# Patient Record
Sex: FEMALE | Race: WHITE | NOT HISPANIC OR LATINO | Employment: UNEMPLOYED | URBAN - METROPOLITAN AREA
[De-identification: names, ages, dates, MRNs, and addresses within clinical notes are randomized per-mention and may not be internally consistent; named-entity substitution may affect disease eponyms.]

---

## 2017-01-11 ENCOUNTER — HOSPITAL ENCOUNTER (OUTPATIENT)
Facility: HOSPITAL | Age: 22
Discharge: HOME | End: 2017-01-11
Attending: OBSTETRICS & GYNECOLOGY | Admitting: OBSTETRICS & GYNECOLOGY

## 2017-01-11 VITALS
TEMPERATURE: 98.9 F | DIASTOLIC BLOOD PRESSURE: 61 MMHG | HEIGHT: 65 IN | BODY MASS INDEX: 41.65 KG/M2 | WEIGHT: 250 LBS | SYSTOLIC BLOOD PRESSURE: 119 MMHG | HEART RATE: 127 BPM | RESPIRATION RATE: 18 BRPM

## 2017-01-11 DIAGNOSIS — R10.9 ABDOMINAL PAIN AFFECTING PREGNANCY: ICD-10-CM

## 2017-01-11 DIAGNOSIS — O26.899 ABDOMINAL PAIN AFFECTING PREGNANCY: ICD-10-CM

## 2017-01-11 PROCEDURE — 99204 OFFICE O/P NEW MOD 45 MIN: CPT

## 2017-01-11 PROCEDURE — 76817 TRANSVAGINAL US OBSTETRIC: CPT | Performed by: OBSTETRICS & GYNECOLOGY

## 2017-01-11 NOTE — IP AVS SNAPSHOT
[address] [place]  [phone]               AFTER VISIT SUMMARY             Martina 5454   21 yrs Female (1995)    MRN:  [id number]            Medications    It is important that you maintain an up-to-date list of medications (prescribed and over-the-counter, as well as vitamins and mineral supplements) that you are taking  Bring your list of current medications whenever you seek treatment at a hospital or other healthcare setting  If you have any questions or concerns, contact your primary care physician's office  Your Medications      Notice     You have not been prescribed any medications  Your Medications      Notice     You have not been prescribed any medications  Summary of Your Hospitalization        About your hospitalization     You were discharged on:  January 11, 2017 You last received care in the:  [address] [facility]     Unit phone number:  [phone]      Reason for Hospitalization     Your primary diagnosis was:  Not on File      Chief Complaint     Abdominal Pain      Treatment Team     Provider Service Role Specialty Primary office phone    Alan Crocker MD OB/GYN Attending Provider Obstetrics and Gynecology [phone]    Jolly Garcia RN -- Registered Nurse  Obstetrics Number not on file         Imaging Results     No results found  Follow-ups for After Discharge        Your Allergies  Date Reviewed: 1/11/2017    No active allergies      Traka     To access this document and other health information online, please visit www  [employer] [facility] to login or create a patient portal account  For questions about any pending test results, you may contact the ordering physician or your primary care provider             Instructions for After Discharge          Educational Information     Notify Physician for any of the following:    · Regular contractions - More than 6 per hour every 5 minutes    · Leakage of fluid (water breaks)  Observe time and color of fluid  · Vaginal bleeding  · Elevated temperature greater than 100 4°F     · Symptoms of frequent urination, blood in the urine and/or burning upon urination  · Decrease in fetal movement in last 8 hours, Fetal kick count less than 10 movements in 2 hours    · Any other questions and/or problems

## 2017-01-12 NOTE — OB NST/BPP/US
Jazz Oro, a  at 24w1d with an KOMAL of 2017, by Patient Reported, was seen at [facility] for the following procedure(s): $Procedure Type: US - Transvaginal]                   Ultrasound Other  Cervical Length: 3 5

## 2017-01-12 NOTE — PROGRESS NOTES
OB Triage Note  Sussy Manriquez 19 y o  female MRN: [id number]  Unit/Bed#: L&D 329-02     KOMAL: Estimated Date of Delivery: 17    HPI: 19 y o   at 25w3d with c/o cramping  She  denies having uterine contractions, no LOF, and no VB  She states she has felt good FM  Pt states she started feeling cramping today, she says it feels like menstrual cramping  FHT: 150 appropriate for gestational age, no deccles     TOCO:  none       Vitals:   HR:  [127] 127  Resp:  [18] 18  BP: (119)/(61) 119/61    Physical Exam:  General: AAOX3  No acute distress   Abdominal: Soft  NT/ND  Gravid  : no pooling, no VB, os closed, no discharge  Wet: neg KOH: neg    SVE:deferred      TVUS: CL 3 5 cm     Ultrasound: see fetal testing note  ~_~_~_~_~_~_~_~_~_~_~_~_~_~_~_~_~_~_~_~_~_~_~_~_~_~_~_~_~_~_~_~_~_~_~    A/P:  at 24w1d here for rule out  labor  After evaluation, no signs of  labor  Fetal testing reassuring  · Discharge home with precautions  Instructions reviewed with patient  Dr Von Garay aware  Signature/Title:  Tali Hogan  Date: 2017  Time: 10:30 PM

## 2017-01-13 PROBLEM — Z3A.24 24 WEEKS GESTATION OF PREGNANCY: Status: ACTIVE | Noted: 2017-01-13

## 2017-02-13 ENCOUNTER — GENERIC CONVERSION - ENCOUNTER (OUTPATIENT)
Dept: OTHER | Facility: OTHER | Age: 22
End: 2017-02-13

## 2017-02-13 ENCOUNTER — ALLSCRIPTS OFFICE VISIT (OUTPATIENT)
Dept: PERINATAL CARE | Facility: CLINIC | Age: 22
End: 2017-02-13

## 2017-02-13 PROCEDURE — 76816 OB US FOLLOW-UP PER FETUS: CPT | Performed by: OBSTETRICS & GYNECOLOGY

## 2017-03-28 ENCOUNTER — ALLSCRIPTS OFFICE VISIT (OUTPATIENT)
Dept: PERINATAL CARE | Facility: CLINIC | Age: 22
End: 2017-03-28

## 2017-03-28 ENCOUNTER — GENERIC CONVERSION - ENCOUNTER (OUTPATIENT)
Dept: OTHER | Facility: OTHER | Age: 22
End: 2017-03-28

## 2017-03-28 PROCEDURE — 76816 OB US FOLLOW-UP PER FETUS: CPT | Performed by: OBSTETRICS & GYNECOLOGY

## 2017-04-04 ENCOUNTER — GENERIC CONVERSION - ENCOUNTER (OUTPATIENT)
Dept: OTHER | Facility: OTHER | Age: 22
End: 2017-04-04

## 2017-04-04 ENCOUNTER — APPOINTMENT (OUTPATIENT)
Dept: LAB | Facility: HOSPITAL | Age: 22
End: 2017-04-04

## 2017-04-04 ENCOUNTER — LAB REQUISITION (OUTPATIENT)
Dept: LAB | Facility: HOSPITAL | Age: 22
End: 2017-04-04

## 2017-04-04 DIAGNOSIS — Z34.90 ENCOUNTER FOR SUPERVISION OF NORMAL PREGNANCY: ICD-10-CM

## 2017-04-04 DIAGNOSIS — Z33.1 PREGNANT STATE, INCIDENTAL: ICD-10-CM

## 2017-04-04 DIAGNOSIS — Z34.03 ENCOUNTER FOR SUPERVISION OF NORMAL FIRST PREGNANCY IN THIRD TRIMESTER: ICD-10-CM

## 2017-04-04 LAB
ABO GROUP BLD: NORMAL
BACTERIA UR QL AUTO: ABNORMAL /HPF
BASOPHILS # BLD AUTO: 0.01 THOUSANDS/ΜL (ref 0–0.1)
BASOPHILS NFR BLD AUTO: 0 % (ref 0–1)
BILIRUB UR QL STRIP: NEGATIVE
BLD GP AB SCN SERPL QL: NEGATIVE
CLARITY UR: ABNORMAL
COLOR UR: ABNORMAL
EOSINOPHIL # BLD AUTO: 0.13 THOUSAND/ΜL (ref 0–0.61)
EOSINOPHIL NFR BLD AUTO: 2 % (ref 0–6)
ERYTHROCYTE [DISTWIDTH] IN BLOOD BY AUTOMATED COUNT: 15.4 % (ref 11.6–15.1)
GLUCOSE 1H P 50 G GLC PO SERPL-MCNC: 187 MG/DL
GLUCOSE UR STRIP-MCNC: NEGATIVE MG/DL
HCT VFR BLD AUTO: 29.6 % (ref 34.8–46.1)
HGB BLD-MCNC: 9.4 G/DL (ref 11.5–15.4)
HGB UR QL STRIP.AUTO: ABNORMAL
KETONES UR STRIP-MCNC: NEGATIVE MG/DL
LEUKOCYTE ESTERASE UR QL STRIP: ABNORMAL
LYMPHOCYTES # BLD AUTO: 1.65 THOUSANDS/ΜL (ref 0.6–4.47)
LYMPHOCYTES NFR BLD AUTO: 22 % (ref 14–44)
MCH RBC QN AUTO: 24.3 PG (ref 26.8–34.3)
MCHC RBC AUTO-ENTMCNC: 31.8 G/DL (ref 31.4–37.4)
MCV RBC AUTO: 77 FL (ref 82–98)
MONOCYTES # BLD AUTO: 0.33 THOUSAND/ΜL (ref 0.17–1.22)
MONOCYTES NFR BLD AUTO: 4 % (ref 4–12)
NEUTROPHILS # BLD AUTO: 5.34 THOUSANDS/ΜL (ref 1.85–7.62)
NEUTS SEG NFR BLD AUTO: 72 % (ref 43–75)
NITRITE UR QL STRIP: NEGATIVE
NON-SQ EPI CELLS URNS QL MICRO: ABNORMAL /HPF
PH UR STRIP.AUTO: 6 [PH] (ref 4.5–8)
PLATELET # BLD AUTO: 259 THOUSANDS/UL (ref 149–390)
PMV BLD AUTO: 10.1 FL (ref 8.9–12.7)
PROT UR STRIP-MCNC: ABNORMAL MG/DL
RBC # BLD AUTO: 3.87 MILLION/UL (ref 3.81–5.12)
RBC #/AREA URNS AUTO: ABNORMAL /HPF
RH BLD: POSITIVE
RUBV IGG SERPL IA-ACNC: >175 IU/ML
SP GR UR STRIP.AUTO: >=1.03 (ref 1–1.03)
UROBILINOGEN UR QL STRIP.AUTO: 0.2 E.U./DL
WBC # BLD AUTO: 7.46 THOUSAND/UL (ref 4.31–10.16)
WBC #/AREA URNS AUTO: ABNORMAL /HPF

## 2017-04-04 PROCEDURE — 80081 OBSTETRIC PANEL INC HIV TSTG: CPT

## 2017-04-04 PROCEDURE — 87591 N.GONORRHOEAE DNA AMP PROB: CPT | Performed by: OBSTETRICS & GYNECOLOGY

## 2017-04-04 PROCEDURE — 82950 GLUCOSE TEST: CPT

## 2017-04-04 PROCEDURE — 87491 CHLMYD TRACH DNA AMP PROBE: CPT | Performed by: OBSTETRICS & GYNECOLOGY

## 2017-04-04 PROCEDURE — 36415 COLL VENOUS BLD VENIPUNCTURE: CPT

## 2017-04-04 PROCEDURE — 81001 URINALYSIS AUTO W/SCOPE: CPT

## 2017-04-04 PROCEDURE — 87653 STREP B DNA AMP PROBE: CPT

## 2017-04-04 PROCEDURE — 87086 URINE CULTURE/COLONY COUNT: CPT

## 2017-04-05 LAB
BACTERIA UR CULT: NORMAL
CHLAMYDIA DNA CVX QL NAA+PROBE: NORMAL
HBV SURFACE AG SER QL: NORMAL
HIV 1+2 AB+HIV1 P24 AG SERPL QL IA: NORMAL
N GONORRHOEA DNA GENITAL QL NAA+PROBE: NORMAL
RPR SER QL: NORMAL

## 2017-04-06 LAB — GP B STREP DNA SPEC QL NAA+PROBE: NORMAL

## 2017-04-11 ENCOUNTER — ALLSCRIPTS OFFICE VISIT (OUTPATIENT)
Dept: OTHER | Facility: OTHER | Age: 22
End: 2017-04-11

## 2017-04-11 ENCOUNTER — ALLSCRIPTS OFFICE VISIT (OUTPATIENT)
Dept: PERINATAL CARE | Facility: CLINIC | Age: 22
End: 2017-04-11

## 2017-04-11 ENCOUNTER — GENERIC CONVERSION - ENCOUNTER (OUTPATIENT)
Dept: OTHER | Facility: OTHER | Age: 22
End: 2017-04-11

## 2017-04-11 LAB
CLARITY UR: NORMAL
COLOR UR: YELLOW
GLUCOSE (HISTORICAL): NORMAL
HGB UR QL STRIP.AUTO: NORMAL
LEUKOCYTE ESTERASE UR QL STRIP: NORMAL
PROT UR STRIP-MCNC: NORMAL MG/DL

## 2017-04-11 PROCEDURE — 76815 OB US LIMITED FETUS(S): CPT | Performed by: OBSTETRICS & GYNECOLOGY

## 2017-04-11 PROCEDURE — 59025 FETAL NON-STRESS TEST: CPT | Performed by: OBSTETRICS & GYNECOLOGY

## 2017-04-18 ENCOUNTER — APPOINTMENT (OUTPATIENT)
Dept: PERINATAL CARE | Facility: CLINIC | Age: 22
End: 2017-04-18

## 2017-04-18 ENCOUNTER — ALLSCRIPTS OFFICE VISIT (OUTPATIENT)
Dept: PERINATAL CARE | Facility: CLINIC | Age: 22
End: 2017-04-18

## 2017-04-18 ENCOUNTER — ALLSCRIPTS OFFICE VISIT (OUTPATIENT)
Dept: OTHER | Facility: OTHER | Age: 22
End: 2017-04-18

## 2017-04-18 ENCOUNTER — GENERIC CONVERSION - ENCOUNTER (OUTPATIENT)
Dept: OTHER | Facility: OTHER | Age: 22
End: 2017-04-18

## 2017-04-18 LAB
GLUCOSE (HISTORICAL): NORMAL
PROT UR STRIP-MCNC: NORMAL MG/DL

## 2017-04-18 PROCEDURE — 76818 FETAL BIOPHYS PROFILE W/NST: CPT | Performed by: OBSTETRICS & GYNECOLOGY

## 2017-04-25 ENCOUNTER — GENERIC CONVERSION - ENCOUNTER (OUTPATIENT)
Dept: OTHER | Facility: OTHER | Age: 22
End: 2017-04-25

## 2017-04-25 ENCOUNTER — ALLSCRIPTS OFFICE VISIT (OUTPATIENT)
Dept: PERINATAL CARE | Facility: CLINIC | Age: 22
End: 2017-04-25

## 2017-04-25 PROCEDURE — 76818 FETAL BIOPHYS PROFILE W/NST: CPT | Performed by: OBSTETRICS & GYNECOLOGY

## 2017-04-25 PROCEDURE — 76816 OB US FOLLOW-UP PER FETUS: CPT | Performed by: OBSTETRICS & GYNECOLOGY

## 2017-04-26 ENCOUNTER — HOSPITAL ENCOUNTER (OUTPATIENT)
Dept: LABOR AND DELIVERY | Facility: HOSPITAL | Age: 22
Discharge: HOME | DRG: 560 | End: 2017-04-26
Payer: COMMERCIAL

## 2017-04-26 ENCOUNTER — HOSPITAL ENCOUNTER (INPATIENT)
Facility: HOSPITAL | Age: 22
LOS: 4 days | Discharge: HOME | DRG: 560 | End: 2017-04-30
Attending: OBSTETRICS & GYNECOLOGY | Admitting: OBSTETRICS & GYNECOLOGY
Payer: COMMERCIAL

## 2017-04-26 DIAGNOSIS — E66.01 MORBID OBESITY DUE TO EXCESS CALORIES (HCC): ICD-10-CM

## 2017-04-26 DIAGNOSIS — O09.299 HX OF PREECLAMPSIA, PRIOR PREGNANCY, CURRENTLY PREGNANT: ICD-10-CM

## 2017-04-26 DIAGNOSIS — Z3A.39 39 WEEKS GESTATION OF PREGNANCY: Primary | ICD-10-CM

## 2017-04-26 DIAGNOSIS — D50.8 IRON DEFICIENCY ANEMIA SECONDARY TO INADEQUATE DIETARY IRON INTAKE: ICD-10-CM

## 2017-04-26 DIAGNOSIS — O24.419 GESTATIONAL DIABETES MELLITUS: ICD-10-CM

## 2017-04-26 PROBLEM — D64.9 ANEMIA: Status: ACTIVE | Noted: 2017-04-26

## 2017-04-26 PROBLEM — Z3A.24 24 WEEKS GESTATION OF PREGNANCY: Status: RESOLVED | Noted: 2017-01-13 | Resolved: 2017-04-26

## 2017-04-26 LAB
ABO GROUP BLD: NORMAL
BLD GP AB SCN SERPL QL: NEGATIVE
ERYTHROCYTE [DISTWIDTH] IN BLOOD BY AUTOMATED COUNT: 15.8 % (ref 11.6–15.1)
HCT VFR BLD AUTO: 29.9 % (ref 34.8–46.1)
HGB BLD-MCNC: 9.6 G/DL (ref 11.5–15.4)
MCH RBC QN AUTO: 24.4 PG (ref 26.8–34.3)
MCHC RBC AUTO-ENTMCNC: 32.1 G/DL (ref 31.4–37.4)
MCV RBC AUTO: 76 FL (ref 82–98)
PLATELET # BLD AUTO: 253 THOUSANDS/UL (ref 149–390)
PMV BLD AUTO: 10.2 FL (ref 8.9–12.7)
RBC # BLD AUTO: 3.94 MILLION/UL (ref 3.81–5.12)
RH BLD: POSITIVE
SPECIMEN EXPIRATION DATE: NORMAL
WBC # BLD AUTO: 8.87 THOUSAND/UL (ref 4.31–10.16)

## 2017-04-26 PROCEDURE — 4A1HXCZ MONITORING OF PRODUCTS OF CONCEPTION, CARDIAC RATE, EXTERNAL APPROACH: ICD-10-PCS | Performed by: OBSTETRICS & GYNECOLOGY

## 2017-04-26 PROCEDURE — 86592 SYPHILIS TEST NON-TREP QUAL: CPT | Performed by: OBSTETRICS & GYNECOLOGY

## 2017-04-26 PROCEDURE — 3E0P7GC INTRODUCTION OF OTHER THERAPEUTIC SUBSTANCE INTO FEMALE REPRODUCTIVE, VIA NATURAL OR ARTIFICIAL OPENING: ICD-10-PCS | Performed by: OBSTETRICS & GYNECOLOGY

## 2017-04-26 PROCEDURE — 86901 BLOOD TYPING SEROLOGIC RH(D): CPT | Performed by: OBSTETRICS & GYNECOLOGY

## 2017-04-26 PROCEDURE — 82948 REAGENT STRIP/BLOOD GLUCOSE: CPT

## 2017-04-26 PROCEDURE — 86850 RBC ANTIBODY SCREEN: CPT | Performed by: OBSTETRICS & GYNECOLOGY

## 2017-04-26 PROCEDURE — 86900 BLOOD TYPING SEROLOGIC ABO: CPT | Performed by: OBSTETRICS & GYNECOLOGY

## 2017-04-26 PROCEDURE — 85027 COMPLETE CBC AUTOMATED: CPT | Performed by: OBSTETRICS & GYNECOLOGY

## 2017-04-26 RX ORDER — LANOLIN ALCOHOL/MO/W.PET/CERES
CREAM (GRAM) TOPICAL
COMMUNITY

## 2017-04-26 RX ORDER — ONDANSETRON 2 MG/ML
4 INJECTION INTRAMUSCULAR; INTRAVENOUS EVERY 6 HOURS PRN
Status: DISCONTINUED | OUTPATIENT
Start: 2017-04-26 | End: 2017-04-28

## 2017-04-26 RX ORDER — SODIUM CHLORIDE, SODIUM LACTATE, POTASSIUM CHLORIDE, CALCIUM CHLORIDE 600; 310; 30; 20 MG/100ML; MG/100ML; MG/100ML; MG/100ML
125 INJECTION, SOLUTION INTRAVENOUS CONTINUOUS
Status: DISCONTINUED | OUTPATIENT
Start: 2017-04-26 | End: 2017-04-28

## 2017-04-26 RX ADMIN — MISOPROSTOL 25 MCG: 100 TABLET ORAL at 22:31

## 2017-04-27 ENCOUNTER — ANESTHESIA EVENT (INPATIENT)
Dept: LABOR AND DELIVERY | Facility: HOSPITAL | Age: 22
DRG: 560 | End: 2017-04-27
Payer: COMMERCIAL

## 2017-04-27 ENCOUNTER — GENERIC CONVERSION - ENCOUNTER (OUTPATIENT)
Dept: OTHER | Facility: OTHER | Age: 22
End: 2017-04-27

## 2017-04-27 LAB
AMPHETAMINES SERPL QL SCN: NEGATIVE
BARBITURATES UR QL: NEGATIVE
BENZODIAZ UR QL: NEGATIVE
COCAINE UR QL: NEGATIVE
GLUCOSE SERPL-MCNC: 105 MG/DL (ref 65–140)
GLUCOSE SERPL-MCNC: 70 MG/DL (ref 65–140)
GLUCOSE SERPL-MCNC: 77 MG/DL (ref 65–140)
GLUCOSE SERPL-MCNC: 80 MG/DL (ref 65–140)
GLUCOSE SERPL-MCNC: 81 MG/DL (ref 65–140)
GLUCOSE SERPL-MCNC: 88 MG/DL (ref 65–140)
METHADONE UR QL: NEGATIVE
OPIATES UR QL SCN: NEGATIVE
PCP UR QL: NEGATIVE
RPR SER QL: NORMAL
THC UR QL: NEGATIVE

## 2017-04-27 PROCEDURE — 4A1H7CZ MONITORING OF PRODUCTS OF CONCEPTION, CARDIAC RATE, VIA NATURAL OR ARTIFICIAL OPENING: ICD-10-PCS | Performed by: OBSTETRICS & GYNECOLOGY

## 2017-04-27 PROCEDURE — 10H07YZ INSERTION OF OTHER DEVICE INTO PRODUCTS OF CONCEPTION, VIA NATURAL OR ARTIFICIAL OPENING: ICD-10-PCS | Performed by: OBSTETRICS & GYNECOLOGY

## 2017-04-27 PROCEDURE — 80307 DRUG TEST PRSMV CHEM ANLYZR: CPT | Performed by: OBSTETRICS & GYNECOLOGY

## 2017-04-27 PROCEDURE — 82948 REAGENT STRIP/BLOOD GLUCOSE: CPT

## 2017-04-27 PROCEDURE — 10H073Z INSERTION OF MONITORING ELECTRODE INTO PRODUCTS OF CONCEPTION, VIA NATURAL OR ARTIFICIAL OPENING: ICD-10-PCS | Performed by: OBSTETRICS & GYNECOLOGY

## 2017-04-27 PROCEDURE — 10907ZC DRAINAGE OF AMNIOTIC FLUID, THERAPEUTIC FROM PRODUCTS OF CONCEPTION, VIA NATURAL OR ARTIFICIAL OPENING: ICD-10-PCS | Performed by: OBSTETRICS & GYNECOLOGY

## 2017-04-27 PROCEDURE — 3E033VJ INTRODUCTION OF OTHER HORMONE INTO PERIPHERAL VEIN, PERCUTANEOUS APPROACH: ICD-10-PCS | Performed by: OBSTETRICS & GYNECOLOGY

## 2017-04-27 RX ORDER — ROPIVACAINE HYDROCHLORIDE 2 MG/ML
INJECTION, SOLUTION EPIDURAL; INFILTRATION; PERINEURAL AS NEEDED
Status: DISCONTINUED | OUTPATIENT
Start: 2017-04-27 | End: 2017-04-28 | Stop reason: SURG

## 2017-04-27 RX ORDER — DIPHENHYDRAMINE HYDROCHLORIDE 50 MG/ML
25 INJECTION INTRAMUSCULAR; INTRAVENOUS ONCE
Status: COMPLETED | OUTPATIENT
Start: 2017-04-27 | End: 2017-04-27

## 2017-04-27 RX ORDER — OXYTOCIN/RINGER'S LACTATE 30/500 ML
1-30 PLASTIC BAG, INJECTION (ML) INTRAVENOUS
Status: DISCONTINUED | OUTPATIENT
Start: 2017-04-27 | End: 2017-04-28

## 2017-04-27 RX ORDER — PROMETHAZINE HYDROCHLORIDE 25 MG/ML
12.5 INJECTION, SOLUTION INTRAMUSCULAR; INTRAVENOUS ONCE
Status: DISCONTINUED | OUTPATIENT
Start: 2017-04-27 | End: 2017-04-27

## 2017-04-27 RX ORDER — BUTORPHANOL TARTRATE 1 MG/ML
1 INJECTION, SOLUTION INTRAMUSCULAR; INTRAVENOUS ONCE
Status: COMPLETED | OUTPATIENT
Start: 2017-04-27 | End: 2017-04-27

## 2017-04-27 RX ADMIN — BUTORPHANOL TARTRATE 1 MG: 1 INJECTION, SOLUTION INTRAMUSCULAR; INTRAVENOUS at 13:22

## 2017-04-27 RX ADMIN — MISOPROSTOL 25 MCG: 100 TABLET ORAL at 02:29

## 2017-04-27 RX ADMIN — ROPIVACAINE HYDROCHLORIDE 3 ML: 2 INJECTION, SOLUTION EPIDURAL; INFILTRATION at 23:53

## 2017-04-27 RX ADMIN — BUTORPHANOL TARTRATE 1 MG: 1 INJECTION, SOLUTION INTRAMUSCULAR; INTRAVENOUS at 08:44

## 2017-04-27 RX ADMIN — Medication 2 MILLI-UNITS/MIN: at 07:13

## 2017-04-27 RX ADMIN — ROPIVACAINE HYDROCHLORIDE 3 ML: 2 INJECTION, SOLUTION EPIDURAL; INFILTRATION at 16:02

## 2017-04-27 RX ADMIN — ROPIVACAINE HYDROCHLORIDE 4 ML: 2 INJECTION, SOLUTION EPIDURAL; INFILTRATION at 16:04

## 2017-04-27 RX ADMIN — DIPHENHYDRAMINE HYDROCHLORIDE 25 MG: 50 INJECTION, SOLUTION INTRAMUSCULAR; INTRAVENOUS at 13:23

## 2017-04-27 RX ADMIN — ROPIVACAINE HYDROCHLORIDE 3 ML: 2 INJECTION, SOLUTION EPIDURAL; INFILTRATION at 23:56

## 2017-04-27 RX ADMIN — ROPIVACAINE HYDROCHLORIDE 100 ML: 2 INJECTION, SOLUTION EPIDURAL; INFILTRATION at 16:12

## 2017-04-27 RX ADMIN — ROPIVACAINE HYDROCHLORIDE 3 ML: 2 INJECTION, SOLUTION EPIDURAL; INFILTRATION at 16:01

## 2017-04-27 RX ADMIN — DIPHENHYDRAMINE HYDROCHLORIDE 25 MG: 50 INJECTION, SOLUTION INTRAMUSCULAR; INTRAVENOUS at 08:44

## 2017-04-28 PROBLEM — O24.419 GESTATIONAL DIABETES MELLITUS: Status: RESOLVED | Noted: 2017-04-26 | Resolved: 2017-04-28

## 2017-04-28 PROBLEM — Z3A.39 39 WEEKS GESTATION OF PREGNANCY: Status: RESOLVED | Noted: 2017-04-26 | Resolved: 2017-04-28

## 2017-04-28 LAB
BASE EXCESS BLDCOA CALC-SCNC: -4.8 MMOL/L (ref 3–11)
BASE EXCESS BLDCOV CALC-SCNC: -4.6 MMOL/L (ref 1–9)
HCO3 BLDCOA-SCNC: 21 MMOL/L (ref 17.3–27.3)
HCO3 BLDCOV-SCNC: 19.7 MMOL/L (ref 12.2–28.6)
O2 CT VFR BLDCOA CALC: 14.7 ML/DL
OXYHGB MFR BLDCOA: 61.9 %
OXYHGB MFR BLDCOV: 73.4 %
PCO2 BLDCOA: 41.5 MM[HG] (ref 30–60)
PCO2 BLDCOV: 35 MM HG (ref 27–43)
PH BLDCOA: 7.32 [PH] (ref 7.23–7.43)
PH BLDCOV: 7.37 [PH] (ref 7.19–7.49)
PO2 BLDCOA: 29.2 MM HG (ref 5–25)
PO2 BLDCOV: 31.9 MM HG (ref 15–45)
SAO2 % BLDCOV: 17.3 ML/DL

## 2017-04-28 PROCEDURE — 82805 BLOOD GASES W/O2 SATURATION: CPT | Performed by: OBSTETRICS & GYNECOLOGY

## 2017-04-28 RX ORDER — DOCUSATE SODIUM 100 MG/1
100 CAPSULE, LIQUID FILLED ORAL 2 TIMES DAILY
Status: DISCONTINUED | OUTPATIENT
Start: 2017-04-28 | End: 2017-04-30 | Stop reason: HOSPADM

## 2017-04-28 RX ORDER — OXYTOCIN/RINGER'S LACTATE 30/500 ML
250 PLASTIC BAG, INJECTION (ML) INTRAVENOUS CONTINUOUS
Status: ACTIVE | OUTPATIENT
Start: 2017-04-28 | End: 2017-04-28

## 2017-04-28 RX ORDER — OXYCODONE HYDROCHLORIDE AND ACETAMINOPHEN 5; 325 MG/1; MG/1
2 TABLET ORAL EVERY 4 HOURS PRN
Status: DISCONTINUED | OUTPATIENT
Start: 2017-04-28 | End: 2017-04-30 | Stop reason: HOSPADM

## 2017-04-28 RX ORDER — BISACODYL 10 MG
10 SUPPOSITORY, RECTAL RECTAL AS NEEDED
Status: DISCONTINUED | OUTPATIENT
Start: 2017-04-28 | End: 2017-04-30 | Stop reason: HOSPADM

## 2017-04-28 RX ORDER — OXYCODONE HYDROCHLORIDE AND ACETAMINOPHEN 5; 325 MG/1; MG/1
1 TABLET ORAL EVERY 4 HOURS PRN
Status: DISCONTINUED | OUTPATIENT
Start: 2017-04-28 | End: 2017-04-30 | Stop reason: HOSPADM

## 2017-04-28 RX ORDER — DIPHENHYDRAMINE HCL 25 MG
25 TABLET ORAL EVERY 6 HOURS PRN
Status: DISCONTINUED | OUTPATIENT
Start: 2017-04-28 | End: 2017-04-30 | Stop reason: HOSPADM

## 2017-04-28 RX ORDER — ACETAMINOPHEN 325 MG/1
650 TABLET ORAL EVERY 4 HOURS PRN
Status: DISCONTINUED | OUTPATIENT
Start: 2017-04-28 | End: 2017-04-30 | Stop reason: HOSPADM

## 2017-04-28 RX ORDER — DIAPER,BRIEF,INFANT-TODD,DISP
1 EACH MISCELLANEOUS AS NEEDED
Status: DISCONTINUED | OUTPATIENT
Start: 2017-04-28 | End: 2017-04-30 | Stop reason: HOSPADM

## 2017-04-28 RX ORDER — SIMETHICONE 80 MG
80 TABLET,CHEWABLE ORAL 4 TIMES DAILY PRN
Status: DISCONTINUED | OUTPATIENT
Start: 2017-04-28 | End: 2017-04-30 | Stop reason: HOSPADM

## 2017-04-28 RX ORDER — IBUPROFEN 600 MG/1
600 TABLET ORAL EVERY 6 HOURS PRN
Status: DISCONTINUED | OUTPATIENT
Start: 2017-04-28 | End: 2017-04-30 | Stop reason: HOSPADM

## 2017-04-28 RX ADMIN — BENZOCAINE AND MENTHOL: 20; .5 SPRAY TOPICAL at 05:17

## 2017-04-28 RX ADMIN — ENOXAPARIN SODIUM 40 MG: 40 INJECTION SUBCUTANEOUS at 22:18

## 2017-04-28 RX ADMIN — DOCUSATE SODIUM 100 MG: 100 CAPSULE, LIQUID FILLED ORAL at 08:19

## 2017-04-28 RX ADMIN — ACETAMINOPHEN 650 MG: 325 TABLET, FILM COATED ORAL at 08:18

## 2017-04-28 RX ADMIN — IBUPROFEN 600 MG: 600 TABLET, FILM COATED ORAL at 22:18

## 2017-04-28 RX ADMIN — WITCH HAZEL 1 PAD: 500 SOLUTION RECTAL; TOPICAL at 05:17

## 2017-04-28 RX ADMIN — IBUPROFEN 600 MG: 600 TABLET, FILM COATED ORAL at 05:18

## 2017-04-28 RX ADMIN — ENOXAPARIN SODIUM 40 MG: 40 INJECTION SUBCUTANEOUS at 08:18

## 2017-04-28 RX ADMIN — DOCUSATE SODIUM 100 MG: 100 CAPSULE, LIQUID FILLED ORAL at 17:50

## 2017-04-29 PROCEDURE — 90715 TDAP VACCINE 7 YRS/> IM: CPT | Performed by: SPECIALIST

## 2017-04-29 RX ORDER — MEDROXYPROGESTERONE ACETATE 150 MG/ML
150 INJECTION, SUSPENSION INTRAMUSCULAR ONCE
Status: COMPLETED | OUTPATIENT
Start: 2017-04-29 | End: 2017-04-29

## 2017-04-29 RX ORDER — FERROUS SULFATE 325(65) MG
325 TABLET ORAL
Status: DISCONTINUED | OUTPATIENT
Start: 2017-04-29 | End: 2017-04-30 | Stop reason: HOSPADM

## 2017-04-29 RX ADMIN — DOCUSATE SODIUM 100 MG: 100 CAPSULE, LIQUID FILLED ORAL at 17:53

## 2017-04-29 RX ADMIN — ENOXAPARIN SODIUM 40 MG: 40 INJECTION SUBCUTANEOUS at 09:27

## 2017-04-29 RX ADMIN — MEDROXYPROGESTERONE ACETATE 150 MG: 150 INJECTION, SUSPENSION INTRAMUSCULAR at 11:50

## 2017-04-29 RX ADMIN — FERROUS SULFATE TAB 325 MG (65 MG ELEMENTAL FE) 325 MG: 325 (65 FE) TAB at 17:53

## 2017-04-29 RX ADMIN — TETANUS TOXOID, REDUCED DIPHTHERIA TOXOID AND ACELLULAR PERTUSSIS VACCINE, ADSORBED 0.5 ML: 5; 2.5; 8; 8; 2.5 SUSPENSION INTRAMUSCULAR at 11:57

## 2017-04-29 RX ADMIN — FERROUS SULFATE TAB 325 MG (65 MG ELEMENTAL FE) 325 MG: 325 (65 FE) TAB at 12:50

## 2017-04-30 VITALS
HEIGHT: 65 IN | DIASTOLIC BLOOD PRESSURE: 85 MMHG | RESPIRATION RATE: 18 BRPM | HEART RATE: 84 BPM | WEIGHT: 272 LBS | SYSTOLIC BLOOD PRESSURE: 118 MMHG | BODY MASS INDEX: 45.32 KG/M2 | TEMPERATURE: 97.8 F

## 2017-04-30 RX ADMIN — ENOXAPARIN SODIUM 40 MG: 40 INJECTION SUBCUTANEOUS at 09:37

## 2017-04-30 RX ADMIN — FERROUS SULFATE TAB 325 MG (65 MG ELEMENTAL FE) 325 MG: 325 (65 FE) TAB at 11:01

## 2017-04-30 RX ADMIN — DOCUSATE SODIUM 100 MG: 100 CAPSULE, LIQUID FILLED ORAL at 09:37

## 2017-04-30 RX ADMIN — IBUPROFEN 600 MG: 600 TABLET, FILM COATED ORAL at 09:37

## 2017-05-01 ENCOUNTER — GENERIC CONVERSION - ENCOUNTER (OUTPATIENT)
Dept: OTHER | Facility: OTHER | Age: 22
End: 2017-05-01

## 2017-05-08 LAB — PLACENTA IN STORAGE: NORMAL

## 2018-01-10 NOTE — PROGRESS NOTES
2017         RE: Cleosergio Posadas                              To: CAROLINA PEREZ [facility]   MR#: [id number]                                     [address] [place] [address]   : AUG [address] ENCVenda Pallas, [address] [place] [address]   (Exam #: [id number])                           Fax: [phone]      The LMP of this 19year old,  G2, P1-0-0-1 patient was 2016, giving   her an KOMAL of MAY 3 2017 and a current gestational age of 42 weeks 6 days   by dates  A sonographic examination was performed on 2017 using   real time equipment  The ultrasound examination was performed using   abdominal technique  The patient has a BMI of 45 3  Her blood pressure   today was 102/67  Earliest ultrasound found in her record: 2016  14w4d  2017 KOMAL      Cardiac motion was observed at 146 bpm       INDICATIONS      prior preeclampsia   morbid obesity      Exam Types      Amniotic Fluid Index      RESULTS      Fetus # 1 of 1   Vertex presentation   Placenta Location = Anterior   No placenta previa   Placenta Grade = III      AMNIOTIC FLUID      Q1:          Q2: 7 5      Q3: 4 7      Q4: 4 7   RONALD Total = 16 9 cm   Amniotic Fluid: Normal      BIOPHYSICAL PROFILE      The Biophysical Profile score was 10/10  Breathin  Movement: 2  Tone: 2  AFV: 2  NST: 2      IMPRESSION      Wagner IUP   Vertex presentation   Regular fetal heart rate of 146 bpm   Anterior placenta   No placenta previa      GENERAL COMMENT      The patient presented today for antepartum fetal surveillance secondary to   morbid obesity  She had a reassuring biophysical profile  The NST was   reactive and reassuring  The amniotic fluid index was 16 9 cm, which is   normal for gestational age  Recommend continued weekly fetal testing secondary to morbid obesity        Thank very much for allowing us to participate in the care of your   patient  Should you have any questions, please do not hesitate to contact   our office  LEISA Thurman M D     Electronically signed 04/18/17 14:47

## 2018-01-11 NOTE — PROGRESS NOTES
DEC 19 2016         RE: Jesdelaney Echols                              To: Pedro   MR#: [id number]                                     [address]   : AUG 32 1995                                  [facility]  ENC: [id number]:GLEN Pace, [address]   (Exam #: [id number])                           Fax: [phone]      The LMP of this 19year old,  G2, P1-0-0-1 patient was 2016, giving   her an KOMAL of MAY 3 2017 and a current gestational age of 25 weeks 5 days   by dates  A sonographic examination was performed on DEC 19 2016 using   real time equipment  The ultrasound examination was performed using   abdominal & vaginal techniques  The patient has a BMI of 40 9  Her blood   pressure today was 117/72  Earliest ultrasound found in her record: 2016  14w4d  2017 KOMAL      Cardiac motion was observed at 155 bpm       INDICATIONS      prior preeclampsia   morbid obesity   fetal anatomical survey      Exam Types      LEVEL II   Transvaginal      RESULTS      Fetus # 1 of 1   Variable presentation   Placenta Location = Anterior   No placenta previa   Placenta Grade = I      MEASUREMENTS (* Included In Average GA)      AC              16 3 cm        21 weeks 1 day * (58%)   BPD              5 0 cm        21 weeks 1 day * (60%)   HC              18 8 cm        21 weeks 0 days* (56%)   Femur            3 7 cm        22 weeks 0 days* (70%)      Nuchal Fold      3 6 mm   NBL              7 8 mm      Humerus          3 5 cm        22 weeks 0 days  (75%)      Cerebellum       2 2 cm        21 weeks 1 day   Biorbit          3 3 cm        21 weeks 3 days   CisternaMagna    3 0 mm      HC/AC           1 15   FL/AC           0 23   FL/BPD          0 74   EFW (Ac/Fl/Hc)   427 grams - 0 lbs 15 oz      THE AVERAGE GESTATIONAL AGE is 21 weeks 2 days +/- 10 days        AMNIOTIC FLUID         Largest Vertical Pocket = 3 9 cm   Amniotic Fluid: Normal      CERVICAL EVALUATION      SUPINE      Cervical Length: 5 00 cm      OTHER TEST RESULTS           Funneling?: No             Dynamic Changes?: No        Resp  To TFP?: No      ANATOMY      Head                                    Normal   Face/Neck                               Normal   Th  Cav  Normal   Heart                                   See Details   Abd  Cav  Normal   Stomach                                 Normal   Right Kidney                            Normal   Left Kidney                             Normal   Bladder                                 Normal   Abd  Wall                               Normal   Spine                                   Normal   Extrems                                 Normal   Genitalia                               Normal   Placenta                                Normal   Umbl  Cord                              Normal   Uterus                                  Normal   PCI                                     Normal      ANATOMY DETAILS      Visualized Appearing Sonographically Normal:   HEAD: (Calvarium, BPD Level, Cavum, Lateral Ventricles, Choroid Plexus,   Cerebellum, Cisterna Magna);    FACE/NECK: (Neck, Nuchal Fold, Profile,   Orbits, Nose/Lips, Palate, Face);    TH  CAV  : (Lungs, Diaphragm); HEART: (Four Chamber View, Proximal Left Outflow, Proximal Right Outflow,   3VV, 3 Vessel Trachea, Short Axis of Greater Vessels, Ductal Arch, Aortic   Arch, IVC, SVC, Cardiac Axis, Cardiac Position);    ABD  CAV : (Liver);      STOMACH, RIGHT KIDNEY, LEFT KIDNEY, BLADDER, ABD  WALL, SPINE: (Cervical   Spine, Thoracic Spine, Lumbar Spine, Sacrum);    EXTREMS: (Lt Humerus, Rt   Humerus, Lt Forearm, Rt Forearm, Lt Hand, Rt Hand, Lt Femur, Rt Femur, Lt   Low Leg, Rt Low Leg, Lt Foot, Rt Foot);    GENITALIA (Male), PLACENTA,   UMBL   CORD, UTERUS, PCI      Not Visualized:   HEART: (Interventricular Septum, Interatrial Septum)      ADNEXA      The left ovary appeared normal and measured 2 3 x 1 9 x 2 2 cm with a   volume of 5 0 cc  The right ovary appeared normal and measured 2 0 x 1 7 x   1 6 cm with a volume of 2 8 cc  IMPRESSION      Wagner IUP   21 weeks and 2 days by this ultrasound  (KOMAL=APR 29 2017)   Variable presentation   Regular fetal heart rate of 155 bpm   Anterior placenta   No placenta previa      GENERAL COMMENT      OFFICE VISIT      On exam today the patient appears well, in no acute distress, and denies   any complaints  Her abdomen is non-tender  The patient has declined genetic screening, and we discussed that a normal   ultrasound does not exclude all congenital birth defects or karyotypic   abnormalities  The fetal anatomic survey is complete except for suboptimal visualization   of the cardiac septum  There is no sonographic evidence of fetal   abnormalities at this time  Good fetal movement and tone are seen  The   amniotic fluid volume appears normal   The placenta is anterior and it   appears sonographically normal   A transvaginal ultrasound was performed   to assess the cervix, which was not seen well transabdominally  The   cervical length was 5 0 centimeters, which is normal for the current   gestational age  There was no significant funneling or dynamic changes   appreciated  The patient was informed of today's findings and all of her   questions were answered  The limitations of ultrasound were reviewed with   the patient, which she appears to understand  The patient had questions regarding the use of low dose aspirin  I had   previously recommended she start low-dose aspirin given her history of   preeclampsia with her prior pregnancy but she states she never took it    I   discussed with her that studies indicate that if low-dose aspirin is not   commence prior to 16 weeks, there is very little effect at reducing the   risk of recurrent preeclampsia or fetal growth restriction  Please note, in addition to the time spent discussing the results of the   ultrasound, I spent approximately 10 minutes of face-to-face time with the   patient, greater than 50% of which was spent in counseling and the   coordination of care for this patient  Thank you very much for allowing us to participate in the care of this   very nice patient  Should you have any questions, please do not hesitate   to contact our office  LEISA Douglas M D     Electronically signed 12/19/16 09:39

## 2018-01-13 VITALS
HEIGHT: 65 IN | BODY MASS INDEX: 44.65 KG/M2 | SYSTOLIC BLOOD PRESSURE: 103 MMHG | DIASTOLIC BLOOD PRESSURE: 70 MMHG | WEIGHT: 268 LBS

## 2018-01-13 VITALS — SYSTOLIC BLOOD PRESSURE: 117 MMHG | BODY MASS INDEX: 45.26 KG/M2 | WEIGHT: 272 LBS | DIASTOLIC BLOOD PRESSURE: 75 MMHG

## 2018-01-13 VITALS
DIASTOLIC BLOOD PRESSURE: 67 MMHG | HEIGHT: 65 IN | BODY MASS INDEX: 45.32 KG/M2 | SYSTOLIC BLOOD PRESSURE: 102 MMHG | WEIGHT: 272 LBS

## 2018-01-13 VITALS — WEIGHT: 270 LBS | DIASTOLIC BLOOD PRESSURE: 76 MMHG | BODY MASS INDEX: 44.93 KG/M2 | SYSTOLIC BLOOD PRESSURE: 124 MMHG

## 2018-01-13 VITALS
SYSTOLIC BLOOD PRESSURE: 135 MMHG | BODY MASS INDEX: 42.59 KG/M2 | WEIGHT: 255.6 LBS | HEIGHT: 65 IN | DIASTOLIC BLOOD PRESSURE: 84 MMHG

## 2018-01-13 VITALS
BODY MASS INDEX: 45.32 KG/M2 | HEIGHT: 65 IN | WEIGHT: 272 LBS | SYSTOLIC BLOOD PRESSURE: 114 MMHG | DIASTOLIC BLOOD PRESSURE: 71 MMHG

## 2018-01-13 NOTE — PROGRESS NOTES
2017         RE: Jennifer Meyerr                              To: CAROLINA PEREZ [facility]   MR#: [id number]                                     [address] [place] [address]   :  [facility]  Anh Post, [address] [place] [address]    (Exam #: [id number])                           Fax: [phone]      The LMP of this 19year old,  G2, P1-0-0-1 patient was 2016, giving   her an KOMAL of MAY 3 2017 and a current gestational age of 42 weeks 6 days   by dates  A sonographic examination was performed on 2017 using   real time equipment  The ultrasound examination was performed using   abdominal technique  The patient has a BMI of 44 9  Her blood pressure   today was 115/74  Earliest ultrasound found in her record: 2016  14w4d  2017 KOMAL      Cardiac motion was observed at 157 bpm       INDICATIONS      prior preeclampsia   morbid obesity      Exam Types      amniotic fluid evaluation      RESULTS      Fetus # 1 of 1   Vertex presentation   Placenta Location = Anterior   No placenta previa   Placenta Grade = III      The NST was reactive with no decelerations  AMNIOTIC FLUID      Q1: 6 4      Q2: 5 0      Q3: 3 9      Q4: 7 4   RONALD Total = 22 6 cm   Amniotic Fluid: Normal      IMPRESSION      Wagner IUP   Vertex presentation   Regular fetal heart rate of 157 bpm   Anterior placenta   No placenta previa      GENERAL COMMENT      The patient presented today for antepartum fetal surveillance secondary to   morbid obesity  She had a modified biophysical profile, which includes an   NST and evaluation of the amniotic fluid  The NST was reactive and   reassuring  The amniotic fluid index was 22 6 cm, which is normal for   gestational age  Recommend continued weekly fetal testing secondary to morbid obesity        Thank very much for allowing us to participate in the care of your   patient  Should you have any questions, please do not hesitate to contact   our office  LEISA Guillen M D     Electronically signed 04/11/17 13:51

## 2018-01-14 VITALS
DIASTOLIC BLOOD PRESSURE: 74 MMHG | BODY MASS INDEX: 44.98 KG/M2 | SYSTOLIC BLOOD PRESSURE: 115 MMHG | WEIGHT: 270 LBS | HEIGHT: 65 IN

## 2018-01-14 NOTE — PROGRESS NOTES
MAR 28 2017         RE: Margarito Mora                              To: CAROLINA PEREZ [facility]   MR#: [id number]                                     [address] [facility]   : AUG [address]  ENC: [id number] Pace, [address]   (Exam #: [id number])                           Fax: [phone]      The LMP of this 19year old,  G2, P1-0-0-1 patient was 2016, giving   her an KOMAL of MAY 3 2017 and a current gestational age of 29 weeks 6 days   by dates  A sonographic examination was performed on MAR 28 2017 using   real time equipment  The ultrasound examination was performed using   abdominal technique  The patient has a BMI of 44 6  Her blood pressure   today was 103/70  Earliest ultrasound found in her record: 2016  14w4d  2017 KOMAL      Sree Ureña has no complaints at her visit today  She reports regular fetal   movements and denies problems related to vaginal bleeding or    labor  She has not had a prenatal visit at the [facility] for   the past approximate 5 months        Cardiac motion was observed at 148 bpm       INDICATIONS      prior preeclampsia   morbid obesity      Exam Types      Level I      RESULTS      Fetus # 1 of 1   Vertex presentation   Fetal growth appeared normal   Placenta Location = Anterior   No placenta previa   Placenta Grade = I      MEASUREMENTS (* Included In Average GA)      AC              32 2 cm        36 weeks 2 days* (74%)   BPD              8 4 cm        33 weeks 6 days* (29%)   HC              30 7 cm        33 weeks 6 days* (18%)   Femur            6 3 cm        32 weeks 4 days* (17%)      Cerebellum       4 6 cm        35 weeks 4 days   CisternaMagna    7 6 mm      HC/AC           0 95   FL/AC           0 20   FL/BPD          0 76   EFW (Ac/Fl/Hc)  2525 grams - 5 lbs 9 oz                 (43%)      THE AVERAGE GESTATIONAL AGE is 34 weeks 1 day +/- 21 days  AMNIOTIC FLUID      Q1: 1 4      Q2: 7 8      Q3: 4 9      Q4: 7 7   RONALD Total = 21 8 cm   Amniotic Fluid: Normal      ANATOMY DETAILS      Visualized Appearing Sonographically Normal:   HEAD: (Calvarium, BPD Level, Cavum, Lateral Ventricles, Cerebellum,   Cisterna Magna);    FACE/NECK: (Profile);    TH  CAV : (Diaphragm); HEART: (Visiogen, Cardiac Axis, Cardiac Position);    STOMACH,   RIGHT KIDNEY, LEFT KIDNEY, BLADDER, PLACENTA      Not Visualized:   HEART: (Proximal Left Outflow, Proximal Right Outflow)      IMPRESSION      Wagner IUP   34 weeks and 1 day by this ultrasound  (KOMAL=MAY 8 2017)   Vertex presentation   Fetal growth appeared normal   Regular fetal heart rate of 148 bpm   Anterior placenta   No placenta previa      GENERAL COMMENT      No fetal structural abnormality is identified on the Level I survey today   in a difficult and limited study secondary to the constraints related to   maternal morbid obesity and the late gestational age  Fetal interval   growth and amniotic fluid volume are normal       Today's ultrasound findings and suggested follow-up were discussed in   detail with Carolina  She will return to the [facility]  in 2 weeks to   begin weekly non stress testing and amniotic fluid volume assessments for   the indication of morbid obesity and an associated increased risk for   stillbirth  She was sent directly to the [facility] in   [facility] from the [facility]  to have a prenatal office appointment   and obtain requisitions for necessary lab work  Daily third trimester   fetal kick counting was discussed at the visit today  The face to face time, in addition to time spent discussing ultrasound   results, was approximately 10 minutes, greater than 50% of which was spent   during counseling and coordination of care  LEISA Barlow M D     Maternal-Fetal Medicine   Electronically signed 03/28/17 13:32

## 2018-01-15 NOTE — PROGRESS NOTES
2017         RE: Floyce Hazy                              To: Gaye Mccollum   MR#: [id number]                                     [address]   : AUG [facility] [address]  ENC: [id number]                             [place], [address]   (Exam #: [id number])                           Fax: [phone]      The LMP of this 19year old,  G2, P1-0-0-1 patient was 2016, giving   her an KOMAL of MAY 3 2017 and a current gestational age of 35 weeks 5 days   by dates  A sonographic examination was performed on 2017 using   real time equipment  The ultrasound examination was performed using   abdominal technique  The patient has a BMI of 42 4  Her blood pressure   today was 135/84  Earliest ultrasound found in her record: 2016  14w4d  2017 OKMAL      Cardiac motion was observed at 149 bpm       INDICATIONS      prior preeclampsia   morbid obesity   Evaluate missed anatomy      Exam Types      Level I      RESULTS      Fetus # 1 of 1   Vertex presentation   Fetal growth appeared normal   Placenta Location = Anterior, fundal      MEASUREMENTS (* Included In Average GA)      AC              26 2 cm        30 weeks 2 days* (74%)   BPD              7 3 cm        29 weeks 2 days* (54%)   HC              27 4 cm        29 weeks 4 days* (54%)   Femur            5 7 cm        30 weeks 1 day * (65%)      Cerebellum       3 5 cm        30 weeks 5 days      HC/AC           1 05   FL/AC           0 22   FL/BPD          0 79   EFW (Ac/Fl/Hc)  1526 grams - 3 lbs 6 oz                 (69%)      THE AVERAGE GESTATIONAL AGE is 29 weeks 6 days +/- 18 days        AMNIOTIC FLUID      Q1: 4 0      Q2: 6 1      Q3: 6 0      Q4: 5 0   RONALD Total = 21 1 cm   Amniotic Fluid: Normal      ANATOMY DETAILS      Visualized Appearing Sonographically Normal:   HEAD: (Calvarium, BPD Level, Lateral Ventricles, Cerebellum); HEART:   (Proximal Left Outflow, Proximal Right Outflow, Interventricular Septum,   Interatrial Septum, Cardiac Axis, Cardiac Position);    STOMACH, RIGHT   KIDNEY, LEFT KIDNEY, BLADDER, GENITALIA (Male), PLACENTA      ANATOMY COMMENTS      The prior fetal anatomic survey was limited the area of the septum  These   anatomic views were seen today as sonographically normal within the   inherent limitations of fetal ultrasound  IMPRESSION      Wagner IUP   29 weeks and 6 days by this ultrasound  (KOMAL=APR 25 2017)   Vertex presentation   Fetal growth appeared normal   Regular fetal heart rate of 149 bpm   Anterior, fundal placenta      GENERAL COMMENT      On exam today the patient appears well, in no acute distress, and denies   any complaints  Her abdomen is non-tender  The fetal anatomic survey is now complete  There is no sonographic   evidence of fetal abnormalities at this time  The remainder of the survey   was completed on December 19  There has been appropriate interval fetal   growth  Good fetal movement and tone are seen  The amniotic fluid volume   appears normal   The placenta is anterior and it appears sonographically   normal   The patient was informed of today's findings and all of her   questions were answered  The limitations of ultrasound were reviewed with   the patient, which she accepts  Precautions and fetal kick counts were   reviewed  We discussed appropriate follow-up in detail and I recommend the patient   return in 6 weeks for a fetal growth evaluation for the indication of   morbid obesity  Additional surveillance is recommended starting at 36   weeks with weekly nonstress test and fluid evaluations        Please note, in addition to the time spent discussing the results of the   ultrasound, approximately 10 minutes were spent in consultation with the   patient and coordination of care, with greater than 50% of the time spent   in direct face-to-face counseling  Thank you very much for allowing us to participate in the care of this   very nice patient  Should you have any questions, please do not hesitate   to contact our office  LEISA Mckinney M D     Electronically signed 02/13/17 11:49

## 2018-01-17 NOTE — PROCEDURES
Procedures by Danuta Valencia MD at 4/27/2017  11:18 AM      Author:  Danuta Valencia MD Service:  OB/GYN Author Type:  Resident    Filed:  4/27/2017 11:25 AM Date of Service:  4/27/2017 11:18 AM Status:  Signed    : Danuta Valencia MD (Resident)  Cosigner:  Fidencio Ferguson MD at 4/27/2017 12:03 PM      Pre-procedure Diagnoses:       1  39 weeks gestation of pregnancy [Z3A 39]       2  Gestational diabetes mellitus (GDM) [O24 419]                Post-procedure Diagnoses:       1  39 weeks gestation of pregnancy [Z3A 39]       2  GDM, class A1 [O24 410]                Procedures:       1  INSERTION OF CERVICAL DILATOR [OBO3 (Custom)]                   IUP at 39-2  Induction for A1GDM - noncompliant  Cervix was unchanged x 10 hours (2/50/-2 soft anterior), s/p cytotec x 2 and initiation of pitocin induction  Garcia balloon placed without complication  Pt tolerated the procedure well  Danuta Valencia MD  OB/GYN PGY-1  4/27/2017 11:24 AM             Received for:Andrew Aundria Osgood M D    Apr 27 2017 12:03PM [facility] Standard Time

## 2018-01-17 NOTE — PROGRESS NOTES
2017         RE: Ana María Echols                              To: [address]   MR#: [id number]                                     [address] [place] [address]   : AUG [facility]  ENC: [id number]                             [facility], [address] [place] [address]    (Exam #: [id number])                           Fax: [phone]      The LMP of this 19year old,  G2, P1-0-0-1 patient was 2016, giving   her an OKMAL of MAY 3 2017 and a current gestational age of 37 weeks 6 days   by dates  A sonographic examination was performed on 2017 using   real time equipment  The ultrasound examination was performed using   abdominal technique  The patient has a BMI of 45 3  Her blood pressure   today was 114/71  Earliest ultrasound found in her record: 2016  14w4d  2017 KOMAL      Selena Moulton has no complaints  She reports regular fetal movement and denies   problems related to vaginal bleeding,  labor, or hypertension  Screening for gestational diabetes on April for revealed a markedly   elevated one-hour post Glucola value of 187 mg/dL, consistent with a   presumptive diagnosis of gestational diabetes  Despite a recommendation,   she has refused to be evaluated within the Diabetes and Pregnancy program   in the [facility]  Cardiac motion was observed at 153 bpm       INDICATIONS      morbid obesity   diabetes, gestational      Exam Types      Level I   Biophysical Profile      RESULTS      Fetus # 1 of 1   Vertex presentation   Placenta Location = Anterior   No placenta previa   Placenta Grade = III      The NST was reactive with no decelerations        MEASUREMENTS (* Included In Average GA)      AC              37 0 cm        41 weeks 2 days* (95%)   BPD              8 8 cm        35 weeks 4 days* (15%)   HC              33 2 cm        37 weeks 2 days* (28%)   Femur            7 5 cm        37 weeks 5 days* (51%)      Cerebellum       5 5 cm        36 weeks 4 days   CisternaMagna    4 6 mm      HC/AC           0 90   FL/AC           0 20   FL/BPD          0 85   EFW (Ac/Fl/Hc)  3828 grams - 8 lbs 7 oz                 (85%)      THE AVERAGE GESTATIONAL AGE is 38 weeks 0 days +/- 21 days  AMNIOTIC FLUID      Q1: 9 2      Q2: 6 3      Q3: 3 2      Q4: 8 7   RONALD Total = 27 3 cm   Amniotic Fluid: POLYHYDRAMNIOS      ANATOMY DETAILS      Visualized Appearing Sonographically Normal:   HEAD: (Calvarium, BPD Level, Lateral Ventricles, Cerebellum);    TH  CAV :   (Diaphragm);    STOMACH, RIGHT KIDNEY, LEFT KIDNEY, BLADDER      Not Visualized:   HEAD: (Cavum); HEART: (Four Chamber View, Proximal Left Outflow,   Proximal Right Outflow)      BIOPHYSICAL PROFILE      The Biophysical Profile score was 10/10  Breathin  Movement: 2  Tone: 2  AFV: 2  NST: 2   The NST was reactive with no decelerations  IMPRESSION      Wagner IUP   38 weeks and 0 days by this ultrasound  (KOMAL=MAY 9 2017)   Vertex presentation   Regular fetal heart rate of 153 bpm   Polyhydramnios   Anterior placenta   No placenta previa      GENERAL COMMENT      No fetal structural abnormality is identified on the Level I survey today   in a difficult and limited study secondary to the constraints related to   maternal morbid obesity and the late gestational age  In particular,   cardiac anatomy is suboptimally imaged  Polyhydramnios is present  The   biophysical profile is normal  Evaluation of biometry reveals an estimated   fetal weight at the 85th percentile for this gestational age  Today's ultrasound findings and suggested follow-up were discussed in   detail with [place]  Given her history of gestational diabetes with   demonstrated non-compliance, the presence of polyhydramnios, and her   history of morbid obesity, there is a significant risk for stillbirth     Accordingly, consideration should be given for delivery soon after she   reaches a gestational age of 39-0/7 weeks  We have tentatively scheduled a   follow-up non stress test in the [facility]  in 48 hours, pending   decision making with respect to labor induction and delivery  Jamshid Keane and   I discussed that her diagnosis of probable gestational diabetes and   history of morbid obesity significantly increase her risk for the   development of type 2 diabetes later in her life  Initial follow-up is   recommended with a 75 g, two-hour, oral glucose tolerance test 6-12 weeks   following delivery  Daily third trimester fetal kick counting was   discussed at the visit today  The face to face time, in addition to time spent discussing ultrasound   results, was approximately 10 minutes, greater than 50% of which was spent   during counseling and coordination of care  LEISA Monique M D     Maternal-Fetal Medicine   Electronically signed 04/25/17 15:18

## 2018-01-17 NOTE — PROGRESS NOTES
2016         RE: Carline Botello                              To: Gaye Mccollum   MR#: [id number]                                     [facility]   : AUG [address]   ENC: [id number]:[facility]                             Rito Pace [address]   (Exam #: [id number])                           Fax: [phone]      The LMP of this 19year old,  G2, P1-0-0-1 patient was 2016, giving   her an KOMAL of MAY 3 2017 and a current gestational age of 12 weeks 0 days   by dates  A sonographic examination was performed on 2016 using real   time equipment  The ultrasound examination was performed using abdominal   technique  The patient has a BMI of 39 9  Her blood pressure today was   114/79  Earliest ultrasound found in her record: 2016  14w4d  2017 KOMAL      Cardiac motion was observed at 154 bpm       INDICATIONS      dating   viability   prior preeclampsia   morbid obesity      Exam Types      Level I      RESULTS      Fetus # 1 of 1   Variable presentation   Placenta Location = Anterior   Placenta Grade = I      MEASUREMENTS (* Included In Average GA)      AC               8 7 cm        14 weeks 4 days* (73%)   BPD              2 7 cm        14 weeks 6 days*   HC              10 3 cm        14 weeks 5 days*   Femur            1 6 cm        14 weeks 4 days*      HC/AC           1 18   FL/AC           0 18   FL/BPD          0 58   EFW (Ac/Fl/Hc)   104 grams - 0 lbs 4 oz      THE AVERAGE GESTATIONAL AGE is 14 weeks 4 days +/- 7 days  AMNIOTIC FLUID         Largest Vertical Pocket = 4 3 cm   Amniotic Fluid: Normal      ANATOMY COMMENTS      Anatomic detail is limited at this early gestational age  The fetal   cranium appeared normal in shape and the nuchal fold was normal in   appearance  The fetal face appears normal with the nose/lips coronal view   seen   The nasal bone appears to be present  The intracranial anatomy was   unremarkable with a normal appear falx, choroids, and lateral ventricles  The CSP, cerebellum and posterior fossa were limited  Limited evaluation   of the cervical, thoracic, lumbar, and sacral spine revealed no obvious   evidence of an open neural tube defect  Anatomy of the fetal thorax   appeared within normal limits  The cardiac rhythm was regular  A four   chamber heart and both outflow tracts are seen with the help of color   doppler and the cardiac axis appears normal  Within the abdomen, the   kidneys, diaphragm, stomach & bladder were visualized and the abdominal   wall appeared intact  A three vessel cord appears to be present  Active   movement of the fetal body & 4 extremities was seen  The genitalia appear   normal   There is no suspicion of a subchorionic hematoma  The placental   cord insertion appears normal  The culdesac was reviewed and no free fluid   was appreciated  The cervix was evaluated transabdominally and appears   normal measuring 35 mm  There is no evidence of spontaneous funneling  ADNEXA      The left ovary was not visualized  The right ovary was not visualized  IMPRESSION      Wagner IUP   14 weeks and 4 days by this ultrasound  (KOMAL=2017)   Variable presentation   Regular fetal heart rate of 154 bpm   Anterior placenta      Jayesh Sandoval      Dear Dr Chen July,      Thank you very much for requesting consultation on this very nice patient   for the indication of a dating ultrasound and prior delivery complicated   by a stroke  This is the patient's second pregnancy  Her first pregnancy   was complicated by preeclampsia at term which the patient was induced  The patient delivered a male infant vaginally with a nuchal cord and the   child had seizure activity in the hospital requiring a NICU admission    A   review of the child's  records does indicate that there was concern   for seizure activity that improved with treatment  He did undergo imaging   which demonstrated evidence of a nonhemorrhagic stroke  A hypercoagulable   workup was performed which was normal   There is no etiology other than   the nuchal cord and he has had a complete recovery  The patient's medical history is significant for anemia during her prior   pregnancy  She has had a pilonidal cyst removed  She has significant   obesity  She denies the current use of tobacco, alcohol, or drugs  She   currently breech prenatal vitamins and she has no known drug allergies  Her family medical history is significant with a cousin with Down   syndrome  No other family members appear to have significant congenital   birth defects  There is no evidence of other significant thromboembolic   events that occurred in the family  A review of systems is otherwise   negative  On exam, the patient appears well, in no acute distress, and her   abdomen is nontender  Today's early anatomic evaluations reassuring  The growth of the fetus is   consistent with her due date by LMP  We had a long discussion regarding the patient's previous obstetrical   history  Given the patient's history of prior preeclampsia, I recommend   initiating low dose aspirin therapy  A recent meta-analysis yielded risk   reductions of 24% for preeclampsia, 20% for intrauterine growth   restriction, and 14% for  birth, with an absolute risk reduction of   2-5% for preeclampsia, one to 5% for intrauterine growth restriction, and   2-4% for  birth  In this study, there was no identified risk of   harm to the mother or fetus but long-term evidence was somewhat limited  Given the overall safety profile and risk-benefit analysis, I recommend an   81 mg aspirin be taken everyday until approximately 35 weeks    Ideally,   low dose aspirin therapy should commence prior to 16 weeks because if   aspirin is taking later, it does not appear to be as effective  I   reviewed these recommendations with the patient and answered all of her   questions to apparent satisfaction  With the parents permission, I reviewed their newborns records and it   appears that he had been appropriate hypercoagulable workup with a   negative prothrombin gene mutation and factor V Leiden  He was ruled out   for the most common inherited thrombophilias  Since there is no other   significant history in the family, that may have been a delivery event   although I discussed with this couple that you can never can be sure  I   did recommend nice couple return at 20 weeks for a detailed fetal anatomic   survey  We discussed options for genetic screening and the patient   appeared interested in undergoing a quad screen, which I discussed can be   obtained through the clinic between 16 and 22 weeks  Thank you very much for allowing us to participate in the care of this   very nice patient  Should you have any questions, please do not hesitate   to contact our office  Please note, in addition to the time spent discussing the results of the   ultrasound, I spent approximately 30 minutes of face-to-face time with the   patient, greater than 50% of which was spent in counseling and the   coordination of care for this patient  Teodoro Ferron, R D M S Verne Cheadle, M D     Electronically signed 11/08/16 04:47

## 2018-01-18 NOTE — MISCELLANEOUS
Message  Spontaneous vaginal delivery on 4/28/2017  Left message for patient to call office with any questions or concerns and to schedule 3 week postpartum appointment  Active Problems    1  BMI 40 0-44 9, adult (V85 41) (Z68 41)   2  BMI 45 0-49 9, adult (V85 42) (Z68 42)   3  Encounter for supervision of normal first pregnancy in third trimester (V22 0) (Z34 03)   4  Gestational diabetes mellitus (GDM) in third trimester, gestational diabetes method of   control unspecified (648 83) (O24 419)   5  High risk pregnancy due to history of previous obstetrical problem in second trimester   (V23 49) (O09 292)   6  Maternal morbid obesity in third trimester, antepartum (649 13,278 01) (O99 213,E66 01)   7  Maternal morbid obesity, antepartum (649 13,278 01) (O99 210,E66 01)   8  Migraine headache (346 90) (G43 909)   9  Morbid or severe obesity due to excess calories (278 01) (E66 01)   10  Normal pregnancy (V22 2) (Z34 90)   11  Open wound of back with complication (683 9) (U00 001G)   12  Open wound of buttock (877 0) (S31 809A)   13  Pilonidal cyst with abscess (685 0) (L05 01)   14  Polyhydramnios, third trimester, single gestation (657 03) (O40 3XX0)   15  Pregnancy (V22 2) (Z33 1)   16  Pregnancy Complicated By Anemia (266 11)   17  Pregnancy with 36 completed weeks gestation (V22 2) (Z3A 36)    Current Meds   1  Iron TABS; Therapy: (Recorded:04Hvd7097) to Recorded   2  Prenatal Vitamins 0 8 MG Oral Tablet; TAKE 1 TABLET DAILY; Therapy: 06LDS0197 to (77 873 135)  Requested for: 31Oct2016; Last   Rx:28Oct2016 Ordered    Allergies    1  No Known Drug Allergies    2   Pollen    Signatures   Electronically signed by : Tayler Luna, ; May  1 2017 10:14AM EST                       (Author)

## 2018-01-22 VITALS — SYSTOLIC BLOOD PRESSURE: 103 MMHG | WEIGHT: 272 LBS | DIASTOLIC BLOOD PRESSURE: 77 MMHG | BODY MASS INDEX: 45.26 KG/M2

## 2018-01-22 VITALS — BODY MASS INDEX: 44.6 KG/M2 | SYSTOLIC BLOOD PRESSURE: 116 MMHG | WEIGHT: 268 LBS | DIASTOLIC BLOOD PRESSURE: 73 MMHG

## 2020-08-07 ENCOUNTER — TRANSCRIBE ORDERS (OUTPATIENT)
Dept: ADMINISTRATIVE | Facility: HOSPITAL | Age: 25
End: 2020-08-07

## 2020-08-07 ENCOUNTER — APPOINTMENT (OUTPATIENT)
Dept: LAB | Facility: HOSPITAL | Age: 25
End: 2020-08-07
Payer: COMMERCIAL

## 2020-08-07 DIAGNOSIS — E05.90 MATERNAL HYPERTHYROIDISM, ANTEPARTUM, FIRST TRIMESTER: ICD-10-CM

## 2020-08-07 DIAGNOSIS — O99.281 MATERNAL HYPERTHYROIDISM, ANTEPARTUM, FIRST TRIMESTER: ICD-10-CM

## 2020-08-07 DIAGNOSIS — O99.281 MATERNAL HYPERTHYROIDISM, ANTEPARTUM, FIRST TRIMESTER: Primary | ICD-10-CM

## 2020-08-07 DIAGNOSIS — I43 NUTRITIONAL AND METABOLIC CARDIOMYOPATHY (HCC): ICD-10-CM

## 2020-08-07 DIAGNOSIS — Z87.59 PERSONAL HISTORY OF TROPHOBLASTIC DISEASE: ICD-10-CM

## 2020-08-07 DIAGNOSIS — E63.9 NUTRITIONAL AND METABOLIC CARDIOMYOPATHY (HCC): ICD-10-CM

## 2020-08-07 DIAGNOSIS — E88.9 NUTRITIONAL AND METABOLIC CARDIOMYOPATHY (HCC): ICD-10-CM

## 2020-08-07 DIAGNOSIS — E05.90 MATERNAL HYPERTHYROIDISM, ANTEPARTUM, FIRST TRIMESTER: Primary | ICD-10-CM

## 2020-08-07 DIAGNOSIS — Z34.91 FIRST TRIMESTER PREGNANCY: ICD-10-CM

## 2020-08-07 DIAGNOSIS — Z87.59 HISTORY OF GESTATIONAL HYPERTENSION: ICD-10-CM

## 2020-08-07 LAB
ABO GROUP BLD: NORMAL
ALBUMIN SERPL BCP-MCNC: 2.8 G/DL (ref 3.5–5)
ALP SERPL-CCNC: 94 U/L (ref 46–116)
ALT SERPL W P-5'-P-CCNC: 18 U/L (ref 12–78)
ANION GAP SERPL CALCULATED.3IONS-SCNC: 12 MMOL/L (ref 4–13)
AST SERPL W P-5'-P-CCNC: 12 U/L (ref 5–45)
BACTERIA UR QL AUTO: ABNORMAL /HPF
BASOPHILS # BLD AUTO: 0.02 THOUSANDS/ΜL (ref 0–0.1)
BASOPHILS NFR BLD AUTO: 0 % (ref 0–1)
BILIRUB SERPL-MCNC: 0.23 MG/DL (ref 0.2–1)
BILIRUB UR QL STRIP: NEGATIVE
BLD GP AB SCN SERPL QL: NEGATIVE
BUN SERPL-MCNC: 7 MG/DL (ref 5–25)
CALCIUM SERPL-MCNC: 8.8 MG/DL (ref 8.3–10.1)
CHLORIDE SERPL-SCNC: 103 MMOL/L (ref 100–108)
CLARITY UR: CLEAR
CO2 SERPL-SCNC: 23 MMOL/L (ref 21–32)
COLOR UR: YELLOW
CREAT SERPL-MCNC: 0.8 MG/DL (ref 0.6–1.3)
EOSINOPHIL # BLD AUTO: 0.08 THOUSAND/ΜL (ref 0–0.61)
EOSINOPHIL NFR BLD AUTO: 1 % (ref 0–6)
ERYTHROCYTE [DISTWIDTH] IN BLOOD BY AUTOMATED COUNT: 15 % (ref 11.6–15.1)
GFR SERPL CREATININE-BSD FRML MDRD: 102 ML/MIN/1.73SQ M
GLUCOSE P FAST SERPL-MCNC: 108 MG/DL (ref 65–99)
GLUCOSE UR STRIP-MCNC: NEGATIVE MG/DL
HBV SURFACE AB SER-ACNC: 5.3 MIU/ML
HBV SURFACE AG SER QL: NORMAL
HCT VFR BLD AUTO: 35.2 % (ref 34.8–46.1)
HCV AB SER QL: NORMAL
HGB BLD-MCNC: 10.8 G/DL (ref 11.5–15.4)
HGB UR QL STRIP.AUTO: NEGATIVE
IMM GRANULOCYTES # BLD AUTO: 0.04 THOUSAND/UL (ref 0–0.2)
IMM GRANULOCYTES NFR BLD AUTO: 0 % (ref 0–2)
KETONES UR STRIP-MCNC: NEGATIVE MG/DL
LEUKOCYTE ESTERASE UR QL STRIP: NEGATIVE
LYMPHOCYTES # BLD AUTO: 1.8 THOUSANDS/ΜL (ref 0.6–4.47)
LYMPHOCYTES NFR BLD AUTO: 19 % (ref 14–44)
MCH RBC QN AUTO: 25.2 PG (ref 26.8–34.3)
MCHC RBC AUTO-ENTMCNC: 30.7 G/DL (ref 31.4–37.4)
MCV RBC AUTO: 82 FL (ref 82–98)
MONOCYTES # BLD AUTO: 0.4 THOUSAND/ΜL (ref 0.17–1.22)
MONOCYTES NFR BLD AUTO: 4 % (ref 4–12)
NEUTROPHILS # BLD AUTO: 7.18 THOUSANDS/ΜL (ref 1.85–7.62)
NEUTS SEG NFR BLD AUTO: 76 % (ref 43–75)
NITRITE UR QL STRIP: NEGATIVE
NON-SQ EPI CELLS URNS QL MICRO: ABNORMAL /HPF
NRBC BLD AUTO-RTO: 0 /100 WBCS
PH UR STRIP.AUTO: 6 [PH]
PLATELET # BLD AUTO: 295 THOUSANDS/UL (ref 149–390)
PMV BLD AUTO: 10.4 FL (ref 8.9–12.7)
POTASSIUM SERPL-SCNC: 3.9 MMOL/L (ref 3.5–5.3)
PROT SERPL-MCNC: 7.9 G/DL (ref 6.4–8.2)
PROT UR STRIP-MCNC: NEGATIVE MG/DL
PROT UR-MCNC: 19 MG/DL
RBC # BLD AUTO: 4.28 MILLION/UL (ref 3.81–5.12)
RBC #/AREA URNS AUTO: ABNORMAL /HPF
RH BLD: POSITIVE
RUBV IGG SERPL IA-ACNC: >175 IU/ML
SODIUM SERPL-SCNC: 138 MMOL/L (ref 136–145)
SP GR UR STRIP.AUTO: 1.02 (ref 1–1.03)
SPECIMEN EXPIRATION DATE: NORMAL
URATE SERPL-MCNC: 4.6 MG/DL (ref 2–6.8)
UROBILINOGEN UR QL STRIP.AUTO: 0.2 E.U./DL
WBC # BLD AUTO: 9.52 THOUSAND/UL (ref 4.31–10.16)
WBC #/AREA URNS AUTO: ABNORMAL /HPF

## 2020-08-07 PROCEDURE — 87086 URINE CULTURE/COLONY COUNT: CPT

## 2020-08-07 PROCEDURE — 84156 ASSAY OF PROTEIN URINE: CPT | Performed by: CLINICAL NURSE SPECIALIST

## 2020-08-07 PROCEDURE — 80081 OBSTETRIC PANEL INC HIV TSTG: CPT

## 2020-08-07 PROCEDURE — 87147 CULTURE TYPE IMMUNOLOGIC: CPT

## 2020-08-07 PROCEDURE — 36415 COLL VENOUS BLD VENIPUNCTURE: CPT

## 2020-08-07 PROCEDURE — 86706 HEP B SURFACE ANTIBODY: CPT

## 2020-08-07 PROCEDURE — 80053 COMPREHEN METABOLIC PANEL: CPT

## 2020-08-07 PROCEDURE — 84550 ASSAY OF BLOOD/URIC ACID: CPT

## 2020-08-07 PROCEDURE — 81001 URINALYSIS AUTO W/SCOPE: CPT | Performed by: CLINICAL NURSE SPECIALIST

## 2020-08-07 PROCEDURE — 86803 HEPATITIS C AB TEST: CPT

## 2020-08-08 LAB
BACTERIA UR CULT: ABNORMAL
BACTERIA UR CULT: ABNORMAL
HIV 1+2 AB+HIV1 P24 AG SERPL QL IA: NORMAL

## 2020-08-10 LAB — RPR SER QL: NORMAL

## 2020-08-18 ENCOUNTER — APPOINTMENT (OUTPATIENT)
Dept: LAB | Facility: HOSPITAL | Age: 25
End: 2020-08-18
Payer: COMMERCIAL

## 2020-08-18 DIAGNOSIS — Z87.59 HISTORY OF GESTATIONAL HYPERTENSION: ICD-10-CM

## 2020-08-18 LAB
ALBUMIN SERPL BCP-MCNC: 2.7 G/DL (ref 3.5–5)
ALP SERPL-CCNC: 100 U/L (ref 46–116)
ALT SERPL W P-5'-P-CCNC: 21 U/L (ref 12–78)
ANION GAP SERPL CALCULATED.3IONS-SCNC: 8 MMOL/L (ref 4–13)
AST SERPL W P-5'-P-CCNC: 12 U/L (ref 5–45)
BILIRUB SERPL-MCNC: 0.19 MG/DL (ref 0.2–1)
BUN SERPL-MCNC: 6 MG/DL (ref 5–25)
CALCIUM SERPL-MCNC: 8.8 MG/DL (ref 8.3–10.1)
CHLORIDE SERPL-SCNC: 102 MMOL/L (ref 100–108)
CO2 SERPL-SCNC: 26 MMOL/L (ref 21–32)
CREAT SERPL-MCNC: 0.66 MG/DL (ref 0.6–1.3)
GFR SERPL CREATININE-BSD FRML MDRD: 122 ML/MIN/1.73SQ M
GLUCOSE SERPL-MCNC: 107 MG/DL (ref 65–140)
POTASSIUM SERPL-SCNC: 3.9 MMOL/L (ref 3.5–5.3)
PROT SERPL-MCNC: 7.9 G/DL (ref 6.4–8.2)
PROT UR-MCNC: 14 MG/DL
SODIUM SERPL-SCNC: 136 MMOL/L (ref 136–145)
URATE SERPL-MCNC: 4.1 MG/DL (ref 2–6.8)

## 2020-08-18 PROCEDURE — 84156 ASSAY OF PROTEIN URINE: CPT | Performed by: CLINICAL NURSE SPECIALIST

## 2020-08-18 PROCEDURE — 36415 COLL VENOUS BLD VENIPUNCTURE: CPT

## 2020-08-18 PROCEDURE — 80053 COMPREHEN METABOLIC PANEL: CPT

## 2020-08-18 PROCEDURE — 84550 ASSAY OF BLOOD/URIC ACID: CPT

## 2020-10-21 ENCOUNTER — HOSPITAL ENCOUNTER (OUTPATIENT)
Facility: HOSPITAL | Age: 25
Setting detail: OBSERVATION
Discharge: HOME | End: 2020-10-22
Attending: OBSTETRICS & GYNECOLOGY | Admitting: STUDENT IN AN ORGANIZED HEALTH CARE EDUCATION/TRAINING PROGRAM
Payer: COMMERCIAL

## 2020-10-21 DIAGNOSIS — O26.891 VAGINAL DISCHARGE DURING PREGNANCY IN FIRST TRIMESTER: Primary | ICD-10-CM

## 2020-10-21 DIAGNOSIS — N89.8 VAGINAL DISCHARGE DURING PREGNANCY IN FIRST TRIMESTER: Primary | ICD-10-CM

## 2020-10-21 PROBLEM — O46.90 VAGINAL BLEEDING IN PREGNANCY: Status: ACTIVE | Noted: 2020-10-21

## 2020-10-21 LAB
A1 MICROGLOB PLACENTAL VAG QL: POSITIVE
ABO GROUP BLD: NORMAL
AMORPH URATE CRY URNS QL MICRO: ABNORMAL /HPF
AMPHETAMINES SERPL QL SCN: NEGATIVE
APTT PPP: 37 SECONDS (ref 23–37)
BACTERIA UR QL AUTO: ABNORMAL /HPF
BARBITURATES UR QL: NEGATIVE
BASOPHILS # BLD AUTO: 0.02 THOUSANDS/ΜL (ref 0–0.1)
BASOPHILS NFR BLD AUTO: 0 % (ref 0–1)
BENZODIAZ UR QL: NEGATIVE
BILIRUB UR QL STRIP: NEGATIVE
BLD GP AB SCN SERPL QL: NEGATIVE
CLARITY UR: ABNORMAL
COCAINE UR QL: NEGATIVE
COLOR UR: YELLOW
EOSINOPHIL # BLD AUTO: 0.11 THOUSAND/ΜL (ref 0–0.61)
EOSINOPHIL NFR BLD AUTO: 1 % (ref 0–6)
ERYTHROCYTE [DISTWIDTH] IN BLOOD BY AUTOMATED COUNT: 14.6 % (ref 11.6–15.1)
FIBRINOGEN PPP-MCNC: 524 MG/DL (ref 227–495)
GLUCOSE UR STRIP-MCNC: NEGATIVE MG/DL
HCT VFR BLD AUTO: 31.6 % (ref 34.8–46.1)
HGB BLD-MCNC: 9.9 G/DL (ref 11.5–15.4)
HGB UR QL STRIP.AUTO: ABNORMAL
IMM GRANULOCYTES # BLD AUTO: 0.08 THOUSAND/UL (ref 0–0.2)
IMM GRANULOCYTES NFR BLD AUTO: 1 % (ref 0–2)
INR PPP: 1.12 (ref 0.84–1.19)
KETONES UR STRIP-MCNC: NEGATIVE MG/DL
LEUKOCYTE ESTERASE UR QL STRIP: NEGATIVE
LYMPHOCYTES # BLD AUTO: 1.84 THOUSANDS/ΜL (ref 0.6–4.47)
LYMPHOCYTES NFR BLD AUTO: 16 % (ref 14–44)
MCH RBC QN AUTO: 25.7 PG (ref 26.8–34.3)
MCHC RBC AUTO-ENTMCNC: 31.3 G/DL (ref 31.4–37.4)
MCV RBC AUTO: 82 FL (ref 82–98)
METHADONE UR QL: NEGATIVE
MONOCYTES # BLD AUTO: 0.39 THOUSAND/ΜL (ref 0.17–1.22)
MONOCYTES NFR BLD AUTO: 3 % (ref 4–12)
NEUTROPHILS # BLD AUTO: 8.96 THOUSANDS/ΜL (ref 1.85–7.62)
NEUTS SEG NFR BLD AUTO: 79 % (ref 43–75)
NITRITE UR QL STRIP: NEGATIVE
NON-SQ EPI CELLS URNS QL MICRO: ABNORMAL /HPF
NRBC BLD AUTO-RTO: 0 /100 WBCS
OPIATES UR QL SCN: NEGATIVE
OXYCODONE+OXYMORPHONE UR QL SCN: NEGATIVE
PCP UR QL: NEGATIVE
PH UR STRIP.AUTO: 6 [PH]
PLATELET # BLD AUTO: 246 THOUSANDS/UL (ref 149–390)
PMV BLD AUTO: 9.8 FL (ref 8.9–12.7)
PROT UR STRIP-MCNC: ABNORMAL MG/DL
PROTHROMBIN TIME: 14.2 SECONDS (ref 11.6–14.5)
RBC # BLD AUTO: 3.85 MILLION/UL (ref 3.81–5.12)
RBC #/AREA URNS AUTO: ABNORMAL /HPF
RH BLD: POSITIVE
SP GR UR STRIP.AUTO: 1.02 (ref 1–1.03)
SPECIMEN EXPIRATION DATE: NORMAL
THC UR QL: NEGATIVE
UROBILINOGEN UR QL STRIP.AUTO: 0.2 E.U./DL
WBC # BLD AUTO: 11.4 THOUSAND/UL (ref 4.31–10.16)
WBC #/AREA URNS AUTO: ABNORMAL /HPF

## 2020-10-21 PROCEDURE — 80307 DRUG TEST PRSMV CHEM ANLYZR: CPT | Performed by: OBSTETRICS & GYNECOLOGY

## 2020-10-21 PROCEDURE — 99218 PR INITIAL OBSERVATION CARE/DAY 30 MINUTES: CPT | Performed by: STUDENT IN AN ORGANIZED HEALTH CARE EDUCATION/TRAINING PROGRAM

## 2020-10-21 PROCEDURE — 76815 OB US LIMITED FETUS(S): CPT | Performed by: OBSTETRICS & GYNECOLOGY

## 2020-10-21 PROCEDURE — 87591 N.GONORRHOEAE DNA AMP PROB: CPT | Performed by: STUDENT IN AN ORGANIZED HEALTH CARE EDUCATION/TRAINING PROGRAM

## 2020-10-21 PROCEDURE — 99242 OFF/OP CONSLTJ NEW/EST SF 20: CPT | Performed by: OBSTETRICS & GYNECOLOGY

## 2020-10-21 PROCEDURE — 84112 EVAL AMNIOTIC FLUID PROTEIN: CPT | Performed by: OBSTETRICS & GYNECOLOGY

## 2020-10-21 PROCEDURE — 76817 TRANSVAGINAL US OBSTETRIC: CPT | Performed by: OBSTETRICS & GYNECOLOGY

## 2020-10-21 PROCEDURE — 85384 FIBRINOGEN ACTIVITY: CPT | Performed by: OBSTETRICS & GYNECOLOGY

## 2020-10-21 PROCEDURE — 76815 OB US LIMITED FETUS(S): CPT | Performed by: STUDENT IN AN ORGANIZED HEALTH CARE EDUCATION/TRAINING PROGRAM

## 2020-10-21 PROCEDURE — 76817 TRANSVAGINAL US OBSTETRIC: CPT | Performed by: STUDENT IN AN ORGANIZED HEALTH CARE EDUCATION/TRAINING PROGRAM

## 2020-10-21 PROCEDURE — 86900 BLOOD TYPING SEROLOGIC ABO: CPT | Performed by: OBSTETRICS & GYNECOLOGY

## 2020-10-21 PROCEDURE — 86850 RBC ANTIBODY SCREEN: CPT | Performed by: OBSTETRICS & GYNECOLOGY

## 2020-10-21 PROCEDURE — 86901 BLOOD TYPING SEROLOGIC RH(D): CPT | Performed by: OBSTETRICS & GYNECOLOGY

## 2020-10-21 PROCEDURE — 87491 CHLMYD TRACH DNA AMP PROBE: CPT | Performed by: STUDENT IN AN ORGANIZED HEALTH CARE EDUCATION/TRAINING PROGRAM

## 2020-10-21 PROCEDURE — G0379 DIRECT REFER HOSPITAL OBSERV: HCPCS

## 2020-10-21 PROCEDURE — 85610 PROTHROMBIN TIME: CPT | Performed by: OBSTETRICS & GYNECOLOGY

## 2020-10-21 PROCEDURE — 85730 THROMBOPLASTIN TIME PARTIAL: CPT | Performed by: OBSTETRICS & GYNECOLOGY

## 2020-10-21 PROCEDURE — 81001 URINALYSIS AUTO W/SCOPE: CPT | Performed by: OBSTETRICS & GYNECOLOGY

## 2020-10-21 PROCEDURE — 85025 COMPLETE CBC W/AUTO DIFF WBC: CPT | Performed by: OBSTETRICS & GYNECOLOGY

## 2020-10-21 PROCEDURE — 76811 OB US DETAILED SNGL FETUS: CPT | Performed by: OBSTETRICS & GYNECOLOGY

## 2020-10-21 PROCEDURE — NC001 PR NO CHARGE: Performed by: OBSTETRICS & GYNECOLOGY

## 2020-10-21 RX ORDER — ACETAMINOPHEN 325 MG/1
650 TABLET ORAL EVERY 4 HOURS PRN
Status: DISCONTINUED | OUTPATIENT
Start: 2020-10-21 | End: 2020-10-22 | Stop reason: HOSPADM

## 2020-10-21 RX ORDER — CALCIUM CARBONATE 200(500)MG
1000 TABLET,CHEWABLE ORAL DAILY PRN
Status: DISCONTINUED | OUTPATIENT
Start: 2020-10-21 | End: 2020-10-22 | Stop reason: HOSPADM

## 2020-10-21 RX ORDER — SODIUM CHLORIDE, SODIUM LACTATE, POTASSIUM CHLORIDE, CALCIUM CHLORIDE 600; 310; 30; 20 MG/100ML; MG/100ML; MG/100ML; MG/100ML
125 INJECTION, SOLUTION INTRAVENOUS CONTINUOUS
Status: DISCONTINUED | OUTPATIENT
Start: 2020-10-21 | End: 2020-10-22 | Stop reason: HOSPADM

## 2020-10-21 RX ORDER — ONDANSETRON 2 MG/ML
4 INJECTION INTRAMUSCULAR; INTRAVENOUS EVERY 8 HOURS PRN
Status: DISCONTINUED | OUTPATIENT
Start: 2020-10-21 | End: 2020-10-22 | Stop reason: HOSPADM

## 2020-10-21 RX ADMIN — SODIUM CHLORIDE, SODIUM LACTATE, POTASSIUM CHLORIDE, AND CALCIUM CHLORIDE 125 ML/HR: .6; .31; .03; .02 INJECTION, SOLUTION INTRAVENOUS at 16:40

## 2020-10-21 RX ADMIN — ACETAMINOPHEN 650 MG: 325 TABLET ORAL at 22:08

## 2020-10-21 RX ADMIN — SODIUM CHLORIDE, SODIUM LACTATE, POTASSIUM CHLORIDE, AND CALCIUM CHLORIDE 125 ML/HR: .6; .31; .03; .02 INJECTION, SOLUTION INTRAVENOUS at 22:22

## 2020-10-21 RX ADMIN — SODIUM CHLORIDE, SODIUM LACTATE, POTASSIUM CHLORIDE, AND CALCIUM CHLORIDE 125 ML/HR: .6; .31; .03; .02 INJECTION, SOLUTION INTRAVENOUS at 08:41

## 2020-10-21 RX ADMIN — DOXYLAMINE SUCCINATE 12.5 MG: 25 TABLET ORAL at 22:22

## 2020-10-22 ENCOUNTER — TELEPHONE (OUTPATIENT)
Dept: PERINATAL CARE | Facility: OTHER | Age: 25
End: 2020-10-22

## 2020-10-22 VITALS
HEART RATE: 81 BPM | DIASTOLIC BLOOD PRESSURE: 63 MMHG | SYSTOLIC BLOOD PRESSURE: 119 MMHG | RESPIRATION RATE: 16 BRPM | HEIGHT: 65 IN | WEIGHT: 283 LBS | OXYGEN SATURATION: 99 % | BODY MASS INDEX: 47.15 KG/M2 | TEMPERATURE: 98.1 F

## 2020-10-22 PROBLEM — O46.92 SECOND TRIMESTER BLEEDING: Status: ACTIVE | Noted: 2020-10-22

## 2020-10-22 PROBLEM — O99.212 MATERNAL MORBID OBESITY IN SECOND TRIMESTER, ANTEPARTUM (HCC): Status: ACTIVE | Noted: 2020-10-22

## 2020-10-22 PROBLEM — E66.01 MATERNAL MORBID OBESITY IN SECOND TRIMESTER, ANTEPARTUM (HCC): Status: ACTIVE | Noted: 2020-10-22

## 2020-10-22 PROBLEM — Z3A.20 20 WEEKS GESTATION OF PREGNANCY: Status: ACTIVE | Noted: 2020-10-22

## 2020-10-22 LAB
BASOPHILS # BLD AUTO: 0.03 THOUSANDS/ΜL (ref 0–0.1)
BASOPHILS NFR BLD AUTO: 0 % (ref 0–1)
EOSINOPHIL # BLD AUTO: 0.19 THOUSAND/ΜL (ref 0–0.61)
EOSINOPHIL NFR BLD AUTO: 2 % (ref 0–6)
ERYTHROCYTE [DISTWIDTH] IN BLOOD BY AUTOMATED COUNT: 15 % (ref 11.6–15.1)
FIBRINOGEN PPP-MCNC: 523 MG/DL (ref 227–495)
HCT VFR BLD AUTO: 28.7 % (ref 34.8–46.1)
HGB BLD-MCNC: 9 G/DL (ref 11.5–15.4)
IMM GRANULOCYTES # BLD AUTO: 0.07 THOUSAND/UL (ref 0–0.2)
IMM GRANULOCYTES NFR BLD AUTO: 1 % (ref 0–2)
LYMPHOCYTES # BLD AUTO: 2.55 THOUSANDS/ΜL (ref 0.6–4.47)
LYMPHOCYTES NFR BLD AUTO: 26 % (ref 14–44)
MCH RBC QN AUTO: 26.1 PG (ref 26.8–34.3)
MCHC RBC AUTO-ENTMCNC: 31.4 G/DL (ref 31.4–37.4)
MCV RBC AUTO: 83 FL (ref 82–98)
MONOCYTES # BLD AUTO: 0.44 THOUSAND/ΜL (ref 0.17–1.22)
MONOCYTES NFR BLD AUTO: 4 % (ref 4–12)
NEUTROPHILS # BLD AUTO: 6.64 THOUSANDS/ΜL (ref 1.85–7.62)
NEUTS SEG NFR BLD AUTO: 67 % (ref 43–75)
NRBC BLD AUTO-RTO: 0 /100 WBCS
PLATELET # BLD AUTO: 208 THOUSANDS/UL (ref 149–390)
PMV BLD AUTO: 10.1 FL (ref 8.9–12.7)
RBC # BLD AUTO: 3.45 MILLION/UL (ref 3.81–5.12)
WBC # BLD AUTO: 9.92 THOUSAND/UL (ref 4.31–10.16)

## 2020-10-22 PROCEDURE — 85025 COMPLETE CBC W/AUTO DIFF WBC: CPT | Performed by: OBSTETRICS & GYNECOLOGY

## 2020-10-22 PROCEDURE — 99225 PR SBSQ OBSERVATION CARE/DAY 25 MINUTES: CPT | Performed by: OBSTETRICS & GYNECOLOGY

## 2020-10-22 PROCEDURE — 85384 FIBRINOGEN ACTIVITY: CPT | Performed by: STUDENT IN AN ORGANIZED HEALTH CARE EDUCATION/TRAINING PROGRAM

## 2020-10-22 PROCEDURE — 99205 OFFICE O/P NEW HI 60 MIN: CPT

## 2020-10-22 RX ADMIN — SODIUM CHLORIDE, SODIUM LACTATE, POTASSIUM CHLORIDE, AND CALCIUM CHLORIDE 125 ML/HR: .6; .31; .03; .02 INJECTION, SOLUTION INTRAVENOUS at 06:14

## 2020-10-23 LAB
C TRACH DNA SPEC QL NAA+PROBE: NEGATIVE
N GONORRHOEA DNA SPEC QL NAA+PROBE: NEGATIVE

## 2020-10-28 ENCOUNTER — TELEPHONE (OUTPATIENT)
Dept: FAMILY MEDICINE CLINIC | Facility: CLINIC | Age: 25
End: 2020-10-28

## 2020-11-09 ENCOUNTER — TRANSCRIBE ORDERS (OUTPATIENT)
Dept: ADMINISTRATIVE | Facility: HOSPITAL | Age: 25
End: 2020-11-09

## 2020-11-09 ENCOUNTER — LAB (OUTPATIENT)
Dept: LAB | Facility: HOSPITAL | Age: 25
End: 2020-11-09
Payer: COMMERCIAL

## 2020-11-09 DIAGNOSIS — E63.9: Primary | ICD-10-CM

## 2020-11-09 DIAGNOSIS — O99.281: ICD-10-CM

## 2020-11-09 DIAGNOSIS — O99.281: Primary | ICD-10-CM

## 2020-11-09 DIAGNOSIS — E63.9: ICD-10-CM

## 2020-11-09 LAB — GLUCOSE 1H P 50 G GLC PO SERPL-MCNC: 194 MG/DL

## 2020-11-09 PROCEDURE — 36415 COLL VENOUS BLD VENIPUNCTURE: CPT

## 2020-11-09 PROCEDURE — 82950 GLUCOSE TEST: CPT

## 2021-01-31 ENCOUNTER — HOSPITAL ENCOUNTER (OUTPATIENT)
Facility: HOSPITAL | Age: 26
Discharge: HOME | End: 2021-01-31
Attending: OBSTETRICS & GYNECOLOGY | Admitting: OBSTETRICS & GYNECOLOGY
Payer: COMMERCIAL

## 2021-01-31 VITALS
RESPIRATION RATE: 18 BRPM | TEMPERATURE: 98.5 F | DIASTOLIC BLOOD PRESSURE: 62 MMHG | SYSTOLIC BLOOD PRESSURE: 134 MMHG | HEART RATE: 114 BPM | OXYGEN SATURATION: 98 %

## 2021-01-31 PROBLEM — Z3A.34 34 WEEKS GESTATION OF PREGNANCY: Status: ACTIVE | Noted: 2020-10-22

## 2021-01-31 LAB — GLUCOSE SERPL-MCNC: 101 MG/DL (ref 65–140)

## 2021-01-31 PROCEDURE — NC001 PR NO CHARGE: Performed by: OBSTETRICS & GYNECOLOGY

## 2021-01-31 PROCEDURE — 99214 OFFICE O/P EST MOD 30 MIN: CPT

## 2021-01-31 PROCEDURE — 82948 REAGENT STRIP/BLOOD GLUCOSE: CPT

## 2021-01-31 NOTE — PROGRESS NOTES
Triage Note - OB  Kelly De La Fuente 30 y o  female MRN: [id number]  Unit/Bed#: L&D 323-01 Encounter: [id number]    Chief Complaint:   Chief Complaint   Patient presents with    Contractions    Diarrhea     KOMAL: Estimated Date of Delivery: 3/11/21    HPI: 30 y o  female  at 34w3d presents with complaint of cramping and BH contractions since yesterday  She also states that she has been having diarrhea since then as well  She denies vomiting  She is tolerating PO  Reports eating peanut butter crackers  She denies vaginal bleeding or LOF  Reports fetal movement  She is a [facility] patient and states she has not rescheduled her appointment with them yet  She was diagnosed with GDM (1hr gtt 194) and needs to obtain her strips  Of note, patient was admitted to [address] [place] [address] in 10/2020 for vaginal bleeding, concern for ROM vs abruption, with negative work-up  She last saw [facility] OB in 2020, however, last US scan was performed 20 and demonstrated fetus in [place] presentation measuring 3lbs 2 oz (>90%), normal fluid, posterior placenta  Pt was supposed to have 32 wk growth US for interval growth  She states she will call tomorrow to reschedule an appointment as I emphasized the need for her to continue her prenatal care given her GDM placing her at higher risk  Pt has hx of PreE in prior pregnancy and initial BP in ED was 163/84, however in triage they have been 130s/60s  Pt is asymptomatic  Vitals: Blood pressure 133/67, pulse (!) 117, temperature 98 5 °F (36 9 °C), temperature source Oral, resp  rate 18, last menstrual period 2020, SpO2 98 %, currently breastfeeding  ,There is no height or weight on file to calculate BMI      Physical Exam  GEN: well developed and well nourished, alert and oriented times 3  Abd: soft, non tender and gravid; morbidly obese  SVE: 1/0/-4, firm, behind pubic bone anteriorly    FHR: 130bpm, moderate variability, positive accels, no decels, reactive  [place]: none    Labs:   Admission on 2021   Component Date Value    POC Glucose 2021 101        Lab, Imaging and other studies: I have personally reviewed pertinent reports  A/P: 30 y o  female  at 34w3d with cramping/BH contractions, not in labor  1) R/o labor  SVE /0/-4, firm and behind the pubic bone anteriorly  [place] w/o contractions  Discussed increased hydration (especially in setting of recent diarrhea), tylenol PRN and warm showers  2)Hx of PreE  - Initial BP in /84, however Bps in triage are 130s/67  Pt asymptomatic  No indication for further evaluation at this time  3) GDM  - Pt diagnosed at [facility] around 28wks  - Recent US  demonstrated macrosomia with EFW >90%  Pt due for follow-up growth US  She was strongly encouraged to f/u care with her [facility] OB  I reviewed risks of uncontrolled GDM in pregnancy with patient  She also had non-compliance in her second pregnancy due to insurance issues  Information provided for [facility] as patient expressed desire to possibly transfer, however states she is going to call her OB tomorrow to reschedule appt  - Fingerstick glucose 101 in triage    Discharge instructions given to patient and pre-term labor precautions reviewed        Myra Jewell MD  OBGYN, PGY-4  2021 3:19 AM

## 2021-06-02 PROCEDURE — 99284 EMERGENCY DEPT VISIT MOD MDM: CPT | Performed by: EMERGENCY MEDICINE

## 2021-06-02 PROCEDURE — 93005 ELECTROCARDIOGRAM TRACING: CPT

## 2021-06-02 PROCEDURE — 99285 EMERGENCY DEPT VISIT HI MDM: CPT

## 2021-06-03 ENCOUNTER — APPOINTMENT (EMERGENCY)
Dept: CT IMAGING | Facility: HOSPITAL | Age: 26
End: 2021-06-03
Payer: COMMERCIAL

## 2021-06-03 ENCOUNTER — HOSPITAL ENCOUNTER (EMERGENCY)
Facility: HOSPITAL | Age: 26
Discharge: HOME | End: 2021-06-03
Attending: EMERGENCY MEDICINE | Admitting: EMERGENCY MEDICINE
Payer: COMMERCIAL

## 2021-06-03 VITALS
SYSTOLIC BLOOD PRESSURE: 135 MMHG | OXYGEN SATURATION: 98 % | DIASTOLIC BLOOD PRESSURE: 90 MMHG | TEMPERATURE: 98.4 F | HEART RATE: 108 BPM | RESPIRATION RATE: 20 BRPM

## 2021-06-03 DIAGNOSIS — F41.8 ANXIETY ABOUT HEALTH: ICD-10-CM

## 2021-06-03 DIAGNOSIS — R07.89 LEFT-SIDED CHEST WALL PAIN: Primary | ICD-10-CM

## 2021-06-03 LAB
ALBUMIN SERPL BCP-MCNC: 3.5 G/DL (ref 3.5–5)
ALP SERPL-CCNC: 136 U/L (ref 46–116)
ALT SERPL W P-5'-P-CCNC: 37 U/L (ref 12–78)
ANION GAP SERPL CALCULATED.3IONS-SCNC: 10 MMOL/L (ref 4–13)
AST SERPL W P-5'-P-CCNC: 16 U/L (ref 5–45)
ATRIAL RATE: 108 BPM
BASOPHILS # BLD AUTO: 0.03 THOUSANDS/ΜL (ref 0–0.1)
BASOPHILS NFR BLD AUTO: 0 % (ref 0–1)
BILIRUB SERPL-MCNC: 0.11 MG/DL (ref 0.2–1)
BILIRUB UR QL STRIP: NEGATIVE
BUN SERPL-MCNC: 13 MG/DL (ref 5–25)
CALCIUM SERPL-MCNC: 9.2 MG/DL (ref 8.3–10.1)
CHLORIDE SERPL-SCNC: 103 MMOL/L (ref 100–108)
CLARITY UR: CLEAR
CO2 SERPL-SCNC: 29 MMOL/L (ref 21–32)
COLOR UR: YELLOW
CREAT SERPL-MCNC: 0.84 MG/DL (ref 0.6–1.3)
D DIMER PPP FEU-MCNC: 0.37 UG/ML FEU
EOSINOPHIL # BLD AUTO: 0.08 THOUSAND/ΜL (ref 0–0.61)
EOSINOPHIL NFR BLD AUTO: 1 % (ref 0–6)
ERYTHROCYTE [DISTWIDTH] IN BLOOD BY AUTOMATED COUNT: 17.1 % (ref 11.6–15.1)
EXT PREG TEST URINE: NEGATIVE
EXT. CONTROL ED NAV: NORMAL
GFR SERPL CREATININE-BSD FRML MDRD: 96 ML/MIN/1.73SQ M
GLUCOSE SERPL-MCNC: 114 MG/DL (ref 65–140)
GLUCOSE UR STRIP-MCNC: NEGATIVE MG/DL
HCT VFR BLD AUTO: 36 % (ref 34.8–46.1)
HGB BLD-MCNC: 10.9 G/DL (ref 11.5–15.4)
HGB UR QL STRIP.AUTO: NEGATIVE
IMM GRANULOCYTES # BLD AUTO: 0.09 THOUSAND/UL (ref 0–0.2)
IMM GRANULOCYTES NFR BLD AUTO: 1 % (ref 0–2)
KETONES UR STRIP-MCNC: NEGATIVE MG/DL
LEUKOCYTE ESTERASE UR QL STRIP: NEGATIVE
LYMPHOCYTES # BLD AUTO: 2.75 THOUSANDS/ΜL (ref 0.6–4.47)
LYMPHOCYTES NFR BLD AUTO: 23 % (ref 14–44)
MCH RBC QN AUTO: 23.6 PG (ref 26.8–34.3)
MCHC RBC AUTO-ENTMCNC: 30.3 G/DL (ref 31.4–37.4)
MCV RBC AUTO: 78 FL (ref 82–98)
MONOCYTES # BLD AUTO: 0.53 THOUSAND/ΜL (ref 0.17–1.22)
MONOCYTES NFR BLD AUTO: 4 % (ref 4–12)
NEUTROPHILS # BLD AUTO: 8.6 THOUSANDS/ΜL (ref 1.85–7.62)
NEUTS SEG NFR BLD AUTO: 71 % (ref 43–75)
NITRITE UR QL STRIP: NEGATIVE
NRBC BLD AUTO-RTO: 0 /100 WBCS
P AXIS: 65 DEGREES
PH UR STRIP.AUTO: 5.5 [PH] (ref 4.5–8)
PLATELET # BLD AUTO: 393 THOUSANDS/UL (ref 149–390)
PMV BLD AUTO: 10.4 FL (ref 8.9–12.7)
POTASSIUM SERPL-SCNC: 3.5 MMOL/L (ref 3.5–5.3)
PR INTERVAL: 182 MS
PROT SERPL-MCNC: 8.5 G/DL (ref 6.4–8.2)
PROT UR STRIP-MCNC: NEGATIVE MG/DL
QRS AXIS: 87 DEGREES
QRSD INTERVAL: 90 MS
QT INTERVAL: 358 MS
QTC INTERVAL: 479 MS
RBC # BLD AUTO: 4.62 MILLION/UL (ref 3.81–5.12)
SODIUM SERPL-SCNC: 142 MMOL/L (ref 136–145)
SP GR UR STRIP.AUTO: 1.02 (ref 1–1.03)
T WAVE AXIS: 30 DEGREES
UROBILINOGEN UR QL STRIP.AUTO: 0.2 E.U./DL
VENTRICULAR RATE: 108 BPM
WBC # BLD AUTO: 12.08 THOUSAND/UL (ref 4.31–10.16)

## 2021-06-03 PROCEDURE — 85025 COMPLETE CBC W/AUTO DIFF WBC: CPT | Performed by: EMERGENCY MEDICINE

## 2021-06-03 PROCEDURE — 81025 URINE PREGNANCY TEST: CPT | Performed by: EMERGENCY MEDICINE

## 2021-06-03 PROCEDURE — 85379 FIBRIN DEGRADATION QUANT: CPT | Performed by: EMERGENCY MEDICINE

## 2021-06-03 PROCEDURE — 93010 ELECTROCARDIOGRAM REPORT: CPT | Performed by: INTERNAL MEDICINE

## 2021-06-03 PROCEDURE — 96374 THER/PROPH/DIAG INJ IV PUSH: CPT

## 2021-06-03 PROCEDURE — 96361 HYDRATE IV INFUSION ADD-ON: CPT

## 2021-06-03 PROCEDURE — 81003 URINALYSIS AUTO W/O SCOPE: CPT

## 2021-06-03 PROCEDURE — 80053 COMPREHEN METABOLIC PANEL: CPT | Performed by: EMERGENCY MEDICINE

## 2021-06-03 PROCEDURE — 71260 CT THORAX DX C+: CPT

## 2021-06-03 PROCEDURE — 36415 COLL VENOUS BLD VENIPUNCTURE: CPT | Performed by: EMERGENCY MEDICINE

## 2021-06-03 RX ORDER — NAPROXEN 500 MG/1
500 TABLET ORAL 2 TIMES DAILY PRN
Qty: 20 TABLET | Refills: 0 | Status: SHIPPED | OUTPATIENT
Start: 2021-06-03 | End: 2021-06-13

## 2021-06-03 RX ORDER — KETOROLAC TROMETHAMINE 30 MG/ML
30 INJECTION, SOLUTION INTRAMUSCULAR; INTRAVENOUS ONCE
Status: COMPLETED | OUTPATIENT
Start: 2021-06-03 | End: 2021-06-03

## 2021-06-03 RX ADMIN — IOHEXOL 85 ML: 350 INJECTION, SOLUTION INTRAVENOUS at 02:13

## 2021-06-03 RX ADMIN — KETOROLAC TROMETHAMINE 30 MG: 30 INJECTION, SOLUTION INTRAMUSCULAR; INTRAVENOUS at 02:22

## 2021-06-03 RX ADMIN — SODIUM CHLORIDE 1000 ML: 0.9 INJECTION, SOLUTION INTRAVENOUS at 00:48

## 2021-06-03 NOTE — ED PROVIDER NOTES
History  Chief Complaint   Patient presents with    Chest Pain     feels like there's a "knot"' in her chest  started around mother's day  states that she felt like her lips were white  states when she was here to give birth with her son the hospital was trying to get into contact with her about her heart but she kept pushing it off  appears anxious in triage  31 yo female with L sided chest pain "it feels like a knot and it travels down my left arm"  Pt tearful and anxious, states she had similar pain after delivery in march and needed blood transfusion  Denies risk factor for blood clot  Denies fever, chills, cough  History provided by:  Patient   used: No    Chest Pain  Pain location:  L chest  Pain quality: aching    Pain radiates to:  L arm  Pain radiates to the back: no    Pain severity:  Moderate  Onset quality:  Gradual  Duration:  2 weeks  Timing:  Constant  Chronicity:  Recurrent  Relieved by:  Nothing  Worsened by: Movement and deep breathing  Ineffective treatments:  None tried  Associated symptoms: anxiety    Associated symptoms: no abdominal pain, no back pain, no cough, no dizziness, no fatigue, no fever, no headache, no nausea, no palpitations, no shortness of breath and not vomiting        Prior to Admission Medications   Prescriptions Last Dose Informant Patient Reported? Taking? Prenatal Multivit-Min-Fe-FA (PRENATAL #2 PO)   Yes No   Sig: Take by mouth   ferrous sulfate 325 (65 FE) MG EC tablet   Yes No   Sig: Take by mouth      Facility-Administered Medications: None       History reviewed  No pertinent past medical history  Past Surgical History:   Procedure Laterality Date    PILONIDAL CYST EXCISION         History reviewed  No pertinent family history  I have reviewed and agree with the history as documented      E-Cigarette/Vaping     E-Cigarette/Vaping Substances     Social History     Tobacco Use    Smoking status: Former Smoker    Smokeless tobacco: Never Used   Substance Use Topics    Alcohol use: No    Drug use: No       Review of Systems   Constitutional: Negative for appetite change, chills, fatigue and fever  HENT: Negative for sore throat  Eyes: Negative for visual disturbance  Respiratory: Negative for cough and shortness of breath  Cardiovascular: Positive for chest pain  Negative for palpitations and leg swelling  Gastrointestinal: Negative for abdominal pain, diarrhea, nausea and vomiting  Genitourinary: Negative for dysuria, frequency, vaginal bleeding and vaginal discharge  Musculoskeletal: Negative for back pain, neck pain and neck stiffness  Skin: Negative for pallor and rash  Allergic/Immunologic: Negative for immunocompromised state  Neurological: Negative for dizziness, light-headedness and headaches  Psychiatric/Behavioral: Negative for confusion  The patient is nervous/anxious  All other systems reviewed and are negative  Physical Exam  Physical Exam  Vitals signs and nursing note reviewed  Constitutional:       General: She is in acute distress (tearful, uncomfortable)  Appearance: She is well-developed  HENT:      Head: Normocephalic and atraumatic  Right Ear: External ear normal       Left Ear: External ear normal    Neck:      Musculoskeletal: Neck supple  Cardiovascular:      Rate and Rhythm: Regular rhythm  Tachycardia present  Heart sounds: No murmur  Pulmonary:      Effort: Pulmonary effort is normal  No respiratory distress  Breath sounds: Normal breath sounds  Chest:      Chest wall: Tenderness (L upper anterior chest wall tenderness, no overlying skin changes) present  Abdominal:      General: Bowel sounds are normal       Palpations: Abdomen is soft  Musculoskeletal: Normal range of motion  Skin:     General: Skin is warm  Coloration: Skin is not pale  Findings: No rash     Neurological:      Mental Status: She is alert and oriented to person, place, and time  Psychiatric:         Mood and Affect: Mood is anxious           Behavior: Behavior normal          Vital Signs  ED Triage Vitals   Temperature Pulse Respirations Blood Pressure SpO2   06/02/21 2350 06/02/21 2350 06/02/21 2350 06/02/21 2350 06/02/21 2350   98 4 °F (36 9 °C) (!) 121 20 149/91 97 %      Temp Source Heart Rate Source Patient Position - Orthostatic VS BP Location FiO2 (%)   06/02/21 2350 06/02/21 2350 06/02/21 2350 06/02/21 2350 --   Oral Monitor Sitting Right arm       Pain Score       06/03/21 0317       3           Vitals:    06/02/21 2350 06/03/21 0317   BP: 149/91 135/90   Pulse: (!) 121 (!) 108   Patient Position - Orthostatic VS: Sitting          Visual Acuity      ED Medications  Medications   sodium chloride 0 9 % bolus 1,000 mL (0 mL Intravenous Stopped 6/3/21 0401)   ketorolac (TORADOL) injection 30 mg (30 mg Intravenous Given 6/3/21 0222)   iohexol (OMNIPAQUE) 350 MG/ML injection (SINGLE-DOSE) 85 mL (85 mL Intravenous Given 6/3/21 0213)       Diagnostic Studies  Results Reviewed     Procedure Component Value Units Date/Time    POCT pregnancy, urine [[id number]]  (Normal) Resulted: 06/03/21 0207    Lab Status: Final result Updated: 06/03/21 0207     EXT PREG TEST UR (Ref: Negative) negative     Control valid    Urine Macroscopic, POC [[id number]] Collected: 06/03/21 0205    Lab Status: Final result Specimen: Urine Updated: 06/03/21 0207     Color, UA Yellow     Clarity, UA Clear     pH, UA 5 5     Leukocytes, UA Negative     Nitrite, UA Negative     Protein, UA Negative mg/dl      Glucose, UA Negative mg/dl      Ketones, UA Negative mg/dl      Urobilinogen, UA 0 2 E U /dl      Bilirubin, UA Negative     Blood, UA Negative     Specific Gravity, UA 1 020    Narrative:      CLINITEK RESULT    Comprehensive metabolic panel [[id number]]  (Abnormal) Collected: 06/03/21 0048    Lab Status: Final result Specimen: Blood from Arm, Right Updated: 06/03/21 0124     Sodium 142 mmol/L Potassium 3 5 mmol/L      Chloride 103 mmol/L      CO2 29 mmol/L      ANION GAP 10 mmol/L      BUN 13 mg/dL      Creatinine 0 84 mg/dL      Glucose 114 mg/dL      Calcium 9 2 mg/dL      AST 16 U/L      ALT 37 U/L      Alkaline Phosphatase 136 U/L      Total Protein 8 5 g/dL      Albumin 3 5 g/dL      Total Bilirubin 0 11 mg/dL      eGFR 96 ml/min/1 73sq m     Narrative:      [employer] guidelines for Chronic Kidney Disease (CKD):     Stage 1 with normal or high GFR (GFR > 90 mL/min/1 73 square meters)    Stage 2 Mild CKD (GFR = 60-89 mL/min/1 73 square meters)    Stage 3A Moderate CKD (GFR = 45-59 mL/min/1 73 square meters)    Stage 3B Moderate CKD (GFR = 30-44 mL/min/1 73 square meters)    Stage 4 Severe CKD (GFR = 15-29 mL/min/1 73 square meters)    Stage 5 End Stage CKD (GFR <15 mL/min/1 73 square meters)  Note: GFR calculation is accurate only with a steady state creatinine    D-Dimer [[id number]]  (Normal) Collected: 06/03/21 0048    Lab Status: Final result Specimen: Blood from Arm, Right Updated: 06/03/21 0114     D-Dimer, Quant 0 37 ug/ml FEU     CBC and differential [[id number]]  (Abnormal) Collected: 06/03/21 0048    Lab Status: Final result Specimen: Blood from Arm, Right Updated: 06/03/21 0102     WBC 12 08 Thousand/uL      RBC 4 62 Million/uL      Hemoglobin 10 9 g/dL      Hematocrit 36 0 %      MCV 78 fL      MCH 23 6 pg      MCHC 30 3 g/dL      RDW 17 1 %      MPV 10 4 fL      Platelets 709 Thousands/uL      nRBC 0 /100 WBCs      Neutrophils Relative 71 %      Immat GRANS % 1 %      Lymphocytes Relative 23 %      Monocytes Relative 4 %      Eosinophils Relative 1 %      Basophils Relative 0 %      Neutrophils Absolute 8 60 Thousands/µL      Immature Grans Absolute 0 09 Thousand/uL      Lymphocytes Absolute 2 75 Thousands/µL      Monocytes Absolute 0 53 Thousand/µL      Eosinophils Absolute 0 08 Thousand/µL      Basophils Absolute 0 03 Thousands/µL                  CT chest with contrast   Final Result by Jennifer Campo MD (06/03 9290)      No CT findings to account for the patient's symptoms  No abnormal enhancement  Diffuse hepatic steatosis  Workstation performed: [id number]                    Procedures  Procedures         ED Course  ED Course as of Jun 03 0415   Thu Jun 03, 2021   0025 Pt seen and examined  31 yo female with L sided chest pain "it feels like a knot and it travels down my left arm"  Pt tearful and anxious, states she had similar pain after delivery in march and needed blood transfusion  Denies risk factor for blood clot  Denies fever, chills, cough  Will check labs including ddimer, EKG, and CT chest       0116 Ddimer neg, will perform CT chest with IV contrast but not PE study  12 Went to tell pt ddimer was negative and when I entered room HR was 88, upon telling her ddimer was neg pt began crying and HR shot up to 110  Definitely anxious about health  Has not given urine to get IV toradol or CT chest        0212 U preg neg, pt given toradol and taken for CT chest        0329 CT chest - No CT findings to account for the patient's symptoms      No abnormal enhancement      Diffuse hepatic steatosis  SBIRT 22yo+      Most Recent Value   SBIRT (22 yo +)   In order to provide better care to our patients, we are screening all of our patients for alcohol and drug use  Would it be okay to ask you these screening questions?   Unable to answer at this time Filed at: 06/03/2021 0059                    MDM    Disposition  Final diagnoses:   Left-sided chest wall pain   Anxiety about health     Time reflects when diagnosis was documented in both MDM as applicable and the Disposition within this note     Time User Action Codes Description Comment    6/3/2021  3:30 AM Cecil MIJARES Add [R07 89] Left-sided chest wall pain     6/3/2021  3:30 AM Chitra Mandel Add [F41 8] Anxiety about health       ED Disposition ED Disposition Condition Date/Time Comment    Discharge Stable Thu Allan 3, 2021  3:30 AM Deonna Moody discharge to home/self care  Follow-up Information     Follow up With Specialties Details Why [address] [facility] 2nd Floor Family Medicine Schedule an appointment as soon as possible for a visit   [address] [facility]  [phone]            Discharge Medication List as of 6/3/2021  3:31 AM      START taking these medications    Details   naproxen (NAPROSYN) 500 mg tablet Take 1 tablet (500 mg total) by mouth 2 (two) times a day as needed for mild pain or moderate pain for up to 10 days, Starting u 6/3/2021, Until Sun 6/13/2021, Normal         CONTINUE these medications which have NOT CHANGED    Details   ferrous sulfate 325 (65 FE) MG EC tablet Take by mouth, Until Discontinued, Historical Med      Prenatal Multivit-Min-Fe-FA (PRENATAL #2 PO) Take by mouth, Starting 10/28/2016, Until Discontinued, Historical Med           No discharge procedures on file      PDMP Review     None          ED Provider  Electronically Signed by           Joey Young DO  06/03/21 5219

## 2021-06-03 NOTE — ED NOTES
[facility] forgot I had [facility] in [facility]"      David Elder RN  06/03/21 9913

## 2021-06-08 ENCOUNTER — APPOINTMENT (EMERGENCY)
Dept: RADIOLOGY | Facility: HOSPITAL | Age: 26
End: 2021-06-08
Payer: COMMERCIAL

## 2021-06-08 ENCOUNTER — HOSPITAL ENCOUNTER (EMERGENCY)
Facility: HOSPITAL | Age: 26
Discharge: HOME | End: 2021-06-08
Attending: EMERGENCY MEDICINE | Admitting: EMERGENCY MEDICINE
Payer: COMMERCIAL

## 2021-06-08 VITALS
DIASTOLIC BLOOD PRESSURE: 72 MMHG | SYSTOLIC BLOOD PRESSURE: 155 MMHG | HEART RATE: 98 BPM | TEMPERATURE: 98.4 F | RESPIRATION RATE: 20 BRPM | OXYGEN SATURATION: 100 %

## 2021-06-08 DIAGNOSIS — R07.89 CHEST WALL PAIN: Primary | ICD-10-CM

## 2021-06-08 DIAGNOSIS — F41.9 ANXIETY: ICD-10-CM

## 2021-06-08 LAB
ANION GAP SERPL CALCULATED.3IONS-SCNC: 10 MMOL/L (ref 4–13)
ATRIAL RATE: 92 BPM
BASOPHILS # BLD AUTO: 0.03 THOUSANDS/ΜL (ref 0–0.1)
BASOPHILS NFR BLD AUTO: 0 % (ref 0–1)
BILIRUB UR QL STRIP: NEGATIVE
BUN SERPL-MCNC: 17 MG/DL (ref 5–25)
CALCIUM SERPL-MCNC: 8.7 MG/DL (ref 8.3–10.1)
CHLORIDE SERPL-SCNC: 105 MMOL/L (ref 100–108)
CLARITY UR: CLEAR
CO2 SERPL-SCNC: 25 MMOL/L (ref 21–32)
COLOR UR: YELLOW
CREAT SERPL-MCNC: 0.97 MG/DL (ref 0.6–1.3)
EOSINOPHIL # BLD AUTO: 0.16 THOUSAND/ΜL (ref 0–0.61)
EOSINOPHIL NFR BLD AUTO: 2 % (ref 0–6)
ERYTHROCYTE [DISTWIDTH] IN BLOOD BY AUTOMATED COUNT: 16.9 % (ref 11.6–15.1)
GFR SERPL CREATININE-BSD FRML MDRD: 80 ML/MIN/1.73SQ M
GLUCOSE SERPL-MCNC: 120 MG/DL (ref 65–140)
GLUCOSE UR STRIP-MCNC: NEGATIVE MG/DL
HCT VFR BLD AUTO: 33.4 % (ref 34.8–46.1)
HGB BLD-MCNC: 10.1 G/DL (ref 11.5–15.4)
HGB UR QL STRIP.AUTO: NEGATIVE
IMM GRANULOCYTES # BLD AUTO: 0.05 THOUSAND/UL (ref 0–0.2)
IMM GRANULOCYTES NFR BLD AUTO: 1 % (ref 0–2)
KETONES UR STRIP-MCNC: NEGATIVE MG/DL
LEUKOCYTE ESTERASE UR QL STRIP: NEGATIVE
LYMPHOCYTES # BLD AUTO: 2.61 THOUSANDS/ΜL (ref 0.6–4.47)
LYMPHOCYTES NFR BLD AUTO: 26 % (ref 14–44)
MCH RBC QN AUTO: 23.5 PG (ref 26.8–34.3)
MCHC RBC AUTO-ENTMCNC: 30.2 G/DL (ref 31.4–37.4)
MCV RBC AUTO: 78 FL (ref 82–98)
MONOCYTES # BLD AUTO: 0.5 THOUSAND/ΜL (ref 0.17–1.22)
MONOCYTES NFR BLD AUTO: 5 % (ref 4–12)
NEUTROPHILS # BLD AUTO: 6.71 THOUSANDS/ΜL (ref 1.85–7.62)
NEUTS SEG NFR BLD AUTO: 66 % (ref 43–75)
NITRITE UR QL STRIP: NEGATIVE
NRBC BLD AUTO-RTO: 0 /100 WBCS
P AXIS: 42 DEGREES
PH UR STRIP.AUTO: 6 [PH] (ref 4.5–8)
PLATELET # BLD AUTO: 340 THOUSANDS/UL (ref 149–390)
PMV BLD AUTO: 10.3 FL (ref 8.9–12.7)
POTASSIUM SERPL-SCNC: 4.1 MMOL/L (ref 3.5–5.3)
PR INTERVAL: 184 MS
PROT UR STRIP-MCNC: NEGATIVE MG/DL
QRS AXIS: 54 DEGREES
QRSD INTERVAL: 98 MS
QT INTERVAL: 344 MS
QTC INTERVAL: 425 MS
RBC # BLD AUTO: 4.3 MILLION/UL (ref 3.81–5.12)
SODIUM SERPL-SCNC: 140 MMOL/L (ref 136–145)
SP GR UR STRIP.AUTO: <=1.005 (ref 1–1.03)
T WAVE AXIS: 28 DEGREES
TROPONIN I SERPL-MCNC: <0.02 NG/ML
UROBILINOGEN UR QL STRIP.AUTO: 0.2 E.U./DL
VENTRICULAR RATE: 92 BPM
WBC # BLD AUTO: 10.06 THOUSAND/UL (ref 4.31–10.16)

## 2021-06-08 PROCEDURE — 96360 HYDRATION IV INFUSION INIT: CPT

## 2021-06-08 PROCEDURE — 80048 BASIC METABOLIC PNL TOTAL CA: CPT | Performed by: PHYSICIAN ASSISTANT

## 2021-06-08 PROCEDURE — 36415 COLL VENOUS BLD VENIPUNCTURE: CPT | Performed by: PHYSICIAN ASSISTANT

## 2021-06-08 PROCEDURE — 93005 ELECTROCARDIOGRAM TRACING: CPT

## 2021-06-08 PROCEDURE — 93010 ELECTROCARDIOGRAM REPORT: CPT | Performed by: INTERNAL MEDICINE

## 2021-06-08 PROCEDURE — 81003 URINALYSIS AUTO W/O SCOPE: CPT

## 2021-06-08 PROCEDURE — 85025 COMPLETE CBC W/AUTO DIFF WBC: CPT | Performed by: PHYSICIAN ASSISTANT

## 2021-06-08 PROCEDURE — 84484 ASSAY OF TROPONIN QUANT: CPT | Performed by: PHYSICIAN ASSISTANT

## 2021-06-08 PROCEDURE — 99285 EMERGENCY DEPT VISIT HI MDM: CPT | Performed by: PHYSICIAN ASSISTANT

## 2021-06-08 PROCEDURE — 96361 HYDRATE IV INFUSION ADD-ON: CPT

## 2021-06-08 PROCEDURE — 71045 X-RAY EXAM CHEST 1 VIEW: CPT

## 2021-06-08 PROCEDURE — 99285 EMERGENCY DEPT VISIT HI MDM: CPT

## 2021-06-08 RX ORDER — HYDROXYZINE HYDROCHLORIDE 25 MG/1
25 TABLET, FILM COATED ORAL EVERY 6 HOURS PRN
Qty: 20 TABLET | Refills: 0 | Status: SHIPPED | OUTPATIENT
Start: 2021-06-08

## 2021-06-08 RX ADMIN — SODIUM CHLORIDE 1000 ML: 0.9 INJECTION, SOLUTION INTRAVENOUS at 03:31

## 2021-06-08 NOTE — DISCHARGE INSTRUCTIONS
Please refer to the attached information for strict return instructions  If symptoms worsen or new symptoms develop please return to the ER  Please follow up with your primary care physician for re-evaluation as soon as possible

## 2021-06-08 NOTE — ED PROVIDER NOTES
History  Chief Complaint   Patient presents with    Chest Pain     Pt reports cp and left arm numbness that started 3hrs ago while watching tv per pt  Baldo Fenton is a 31 yo F presenting with left sided chest pain which has been ongoing for the past 4-5 days  She reports pain waxes/wanes and seems to worsen with certain movements, deep breathing, and touching chest wall  She also notes her pain improves with supportive bras or lifting left breast  Denies radiation of pain to back or to abdomen  She reports she has not been dyspneic the entire time, but does note this pain has been increasing her anxiety, and reports several episodes of severe anxiety during which she feels dyspneic, anxious, lightheaded, and hyperventilates  She notes she does have a history of occasional similar anxiety/panic attacks, but believes her chest pain is worsening her anxiety  She was evaluated in the ED on 6/3 where she underwent workup including CBC, CMP, negative range d-dimer and CT chest  She reports her symptoms are largely unchanged since that time  No recent trauma or surgery  No prolonged immobilization or recent travel  No history of DVT or PE  No family history of blood clots or coagulation disorder  No exogenous estrogen/OCP use  Has been taking naproxen with minimal relief  History provided by:  Patient   used: No    Chest Pain  Pain location:  L chest  Pain quality: aching and pressure    Pain radiates to:  Does not radiate  Pain radiates to the back: no    Timing:  Constant  Progression:  Waxing and waning  Chronicity:  New  Context: breathing and movement    Relieved by: NSAIDs  Worsened by:   Movement, deep breathing and certain positions  Associated symptoms: anxiety and shortness of breath    Associated symptoms: no abdominal pain, no back pain, no cough, no diaphoresis, no dizziness, no fever, no lower extremity edema, no nausea, no numbness, no orthopnea, no palpitations, no syncope, not vomiting and no weakness    Risk factors: no coronary artery disease, no diabetes mellitus, no high cholesterol, no prior DVT/PE and no surgery        Prior to Admission Medications   Prescriptions Last Dose Informant Patient Reported? Taking? Prenatal Multivit-Min-Fe-FA (PRENATAL #2 PO)   Yes No   Sig: Take by mouth   ferrous sulfate 325 (65 FE) MG EC tablet   Yes No   Sig: Take by mouth   naproxen (NAPROSYN) 500 mg tablet   No No   Sig: Take 1 tablet (500 mg total) by mouth 2 (two) times a day as needed for mild pain or moderate pain for up to 10 days      Facility-Administered Medications: None       Past Medical History:   Diagnosis Date    First degree AV block        Past Surgical History:   Procedure Laterality Date    PILONIDAL CYST EXCISION         History reviewed  No pertinent family history  I have reviewed and agree with the history as documented  E-Cigarette/Vaping     E-Cigarette/Vaping Substances     Social History     Tobacco Use    Smoking status: Former Smoker    Smokeless tobacco: Never Used   Substance Use Topics    Alcohol use: No    Drug use: No       Review of Systems   Constitutional: Negative for chills, diaphoresis and fever  HENT: Negative for congestion, rhinorrhea and sore throat  Eyes: Negative for pain and visual disturbance  Respiratory: Positive for shortness of breath  Negative for cough and wheezing  Cardiovascular: Positive for chest pain  Negative for palpitations, orthopnea, leg swelling and syncope  Gastrointestinal: Negative for abdominal pain, constipation, diarrhea, nausea and vomiting  Genitourinary: Negative for dysuria, frequency and urgency  Musculoskeletal: Negative for back pain, neck pain and neck stiffness  Skin: Negative for rash and wound  Neurological: Negative for dizziness, weakness, light-headedness and numbness  Psychiatric/Behavioral: Negative for self-injury and suicidal ideas  The patient is nervous/anxious  Physical Exam  Physical Exam  Constitutional:       General: She is not in acute distress  Appearance: She is well-developed  She is not toxic-appearing or diaphoretic  HENT:      Head: Normocephalic and atraumatic  Right Ear: External ear normal       Left Ear: External ear normal    Eyes:      Conjunctiva/sclera: Conjunctivae normal       Pupils: Pupils are equal, round, and reactive to light  Neck:      Musculoskeletal: Normal range of motion and neck supple  Cardiovascular:      Rate and Rhythm: Normal rate and regular rhythm  Heart sounds: Normal heart sounds  No murmur  No friction rub  No gallop  Comments: No lower extremity edema or calf TTP  Negative Nathalie's b/l  Pulmonary:      Effort: Pulmonary effort is normal  No respiratory distress  Breath sounds: Normal breath sounds  No wheezing, rhonchi or rales  Comments: L sided chest tenderness reproducing patient's pain  No overlying rashes/lesions  Chest:      Chest wall: Tenderness present  Abdominal:      General: There is no distension  Palpations: Abdomen is soft  Tenderness: There is no abdominal tenderness  There is no right CVA tenderness, left CVA tenderness or guarding  Lymphadenopathy:      Cervical: No cervical adenopathy  Skin:     General: Skin is warm and dry  Capillary Refill: Capillary refill takes less than 2 seconds  Findings: No erythema or rash  Neurological:      Mental Status: She is alert and oriented to person, place, and time  Motor: No abnormal muscle tone  Coordination: Coordination normal    Psychiatric:         Behavior: Behavior normal          Thought Content:  Thought content normal          Judgment: Judgment normal          Vital Signs  ED Triage Vitals [06/08/21 0219]   Temperature Pulse Respirations Blood Pressure SpO2   98 3 °F (36 8 °C) 104 22 154/77 100 %      Temp Source Heart Rate Source Patient Position - Orthostatic VS BP Location FiO2 (%) Oral Monitor -- Right arm --      Pain Score       --           Vitals:    06/08/21 0219 06/08/21 0510   BP: 154/77 155/72   Pulse: 104 98         Visual Acuity      ED Medications  Medications   sodium chloride 0 9 % bolus 1,000 mL (0 mL Intravenous Stopped 6/8/21 0510)       Diagnostic Studies  Results Reviewed     Procedure Component Value Units Date/Time    Troponin I [[id number]]  (Normal) Collected: 06/08/21 0330    Lab Status: Final result Specimen: Blood from Arm, Right Updated: 06/08/21 0418     Troponin I <0 02 ng/mL     Basic metabolic panel [[id number]] Collected: 06/08/21 0330    Lab Status: Final result Specimen: Blood from Arm, Right Updated: 06/08/21 0408     Sodium 140 mmol/L      Potassium 4 1 mmol/L      Chloride 105 mmol/L      CO2 25 mmol/L      ANION GAP 10 mmol/L      BUN 17 mg/dL      Creatinine 0 97 mg/dL      Glucose 120 mg/dL      Calcium 8 7 mg/dL      eGFR 80 ml/min/1 73sq m     Narrative:      [place] guidelines for Chronic Kidney Disease (CKD):     Stage 1 with normal or high GFR (GFR > 90 mL/min/1 73 square meters)    Stage 2 Mild CKD (GFR = 60-89 mL/min/1 73 square meters)    Stage 3A Moderate CKD (GFR = 45-59 mL/min/1 73 square meters)    Stage 3B Moderate CKD (GFR = 30-44 mL/min/1 73 square meters)    Stage 4 Severe CKD (GFR = 15-29 mL/min/1 73 square meters)    Stage 5 End Stage CKD (GFR <15 mL/min/1 73 square meters)  Note: GFR calculation is accurate only with a steady state creatinine    CBC and differential [[id number]]  (Abnormal) Collected: 06/08/21 0330    Lab Status: Final result Specimen: Blood from Arm, Right Updated: 06/08/21 0355     WBC 10 06 Thousand/uL      RBC 4 30 Million/uL      Hemoglobin 10 1 g/dL      Hematocrit 33 4 %      MCV 78 fL      MCH 23 5 pg      MCHC 30 2 g/dL      RDW 16 9 %      MPV 10 3 fL      Platelets 097 Thousands/uL      nRBC 0 /100 WBCs      Neutrophils Relative 66 %      Immat GRANS % 1 %      Lymphocytes Relative 26 %      Monocytes Relative 5 %      Eosinophils Relative 2 %      Basophils Relative 0 %      Neutrophils Absolute 6 71 Thousands/µL      Immature Grans Absolute 0 05 Thousand/uL      Lymphocytes Absolute 2 61 Thousands/µL      Monocytes Absolute 0 50 Thousand/µL      Eosinophils Absolute 0 16 Thousand/µL      Basophils Absolute 0 03 Thousands/µL     Urine Macroscopic, POC [[id number]] Collected: 06/08/21 0336    Lab Status: Final result Specimen: Urine Updated: 06/08/21 0337     Color, UA Yellow     Clarity, UA Clear     pH, UA 6 0     Leukocytes, UA Negative     Nitrite, UA Negative     Protein, UA Negative mg/dl      Glucose, UA Negative mg/dl      Ketones, UA Negative mg/dl      Urobilinogen, UA 0 2 E U /dl      Bilirubin, UA Negative     Blood, UA Negative     Specific Gravity, UA <=1 005    Narrative:      CLINITEK RESULT                 XR chest 1 view portable   Final Result by Nadine Lorenzo MD (06/08 4370)      No acute cardiopulmonary disease                 Workstation performed: [id number]                    Procedures  ECG 12 Lead Documentation Only    Date/Time: 6/8/2021 2:20 AM  Performed by: Marilynn Padron PA-C  Authorized by: Marilynn Padron PA-C     Indications / Diagnosis:  Chest pain  ECG reviewed by me, the ED Provider: yes    Patient location:  ED  Previous ECG:     Previous ECG:  Compared to current    Similarity:  No change    Comparison to cardiac monitor: Yes    Interpretation:     Interpretation: normal    Rate:     ECG rate:  92    ECG rate assessment: normal    Rhythm:     Rhythm: sinus rhythm    Ectopy:     Ectopy: none    QRS:     QRS axis:  Normal    QRS intervals:  Normal  Conduction:     Conduction: normal    ST segments:     ST segments:  Normal  T waves:     T waves: normal               ED Course             HEART Risk Score      Most Recent Value   Heart Score Risk Calculator   History  0 Filed at: 06/08/2021 0446   ECG  0 Filed at: 06/08/2021 0446   Age  0 Filed at: 06/08/2021 0446   Risk Factors  0 Filed at: 06/08/2021 0446   Troponin  0 Filed at: 06/08/2021 0446   HEART Score  0 Filed at: 06/08/2021 0446                                    [facility]  Number of Diagnoses or Management Options  Anxiety:   Chest wall pain:   Diagnosis management comments: L sided chest pain over past several days, recent extensive workup for similar including d-dimer, EKG, CBC/metabolic panel, CT chest  Chest pain is reproducible by exam and worse with movement, position consistent with msk pain  Initial EKG today shows NSR without ischemic changes or ectopy  Will re-check CBC, BMP, troponin for cardiac ischemia, CXR for infiltrate, place on monitor during ED course for ectopy  Amount and/or Complexity of Data Reviewed  Clinical lab tests: ordered and reviewed  Tests in the radiology section of CPT®: ordered    Patient Progress  Patient progress: stable      Disposition  Final diagnoses:   Chest wall pain   Anxiety     Time reflects when diagnosis was documented in both MDM as applicable and the Disposition within this note     Time User Action Codes Description Comment    6/8/2021  4:53 AM Noé Lopes Add [R07 89] Chest wall pain     6/8/2021  4:53 AM Noé Lopes Add [F41 9] Anxiety       ED Disposition     ED Disposition Condition Date/Time Comment    Discharge Stable Tue Jun 8, 2021  4:46 AM Lukasz Moody discharge to home/self care              Follow-up Information     Follow up With Specialties Details Why Contact Info Additional [address] [facility] Emergency Department Emergency Medicine  If symptoms worsen [place] [id number]  [address] [place] [address] Emergency Department, [address] [place] [address], [facility]    Primary care physician  Schedule an appointment as soon as possible for a visit              Discharge Medication List as of 6/8/2021  4:54 AM      START taking these medications    Details   hydrOXYzine HCL (ATARAX) 25 mg tablet Take 1 tablet (25 mg total) by mouth every 6 (six) hours as needed for anxiety, Starting Tue 6/8/2021, Normal         CONTINUE these medications which have NOT CHANGED    Details   ferrous sulfate 325 (65 FE) MG EC tablet Take by mouth, Until Discontinued, Historical Med      naproxen (NAPROSYN) 500 mg tablet Take 1 tablet (500 mg total) by mouth 2 (two) times a day as needed for mild pain or moderate pain for up to 10 days, Starting Thu 6/3/2021, Until Sun 6/13/2021, Normal      Prenatal Multivit-Min-Fe-FA (PRENATAL #2 PO) Take by mouth, Starting 10/28/2016, Until Discontinued, Historical Med           No discharge procedures on file      PDMP Review     None          ED Provider  Electronically Signed by           Rodolfo Osorio PA-C  06/08/21 1821